# Patient Record
Sex: MALE | Race: WHITE | Employment: OTHER | URBAN - METROPOLITAN AREA
[De-identification: names, ages, dates, MRNs, and addresses within clinical notes are randomized per-mention and may not be internally consistent; named-entity substitution may affect disease eponyms.]

---

## 2018-08-14 PROBLEM — N28.9 RENAL INSUFFICIENCY: Status: ACTIVE | Noted: 2018-08-14

## 2018-08-14 PROBLEM — I10 ESSENTIAL HYPERTENSION: Status: ACTIVE | Noted: 2018-08-14

## 2018-08-14 PROBLEM — E66.09 CLASS 1 OBESITY DUE TO EXCESS CALORIES WITHOUT SERIOUS COMORBIDITY WITH BODY MASS INDEX (BMI) OF 34.0 TO 34.9 IN ADULT: Status: ACTIVE | Noted: 2018-08-14

## 2018-08-14 PROBLEM — E11.9 TYPE 2 DIABETES MELLITUS WITHOUT COMPLICATION, WITHOUT LONG-TERM CURRENT USE OF INSULIN (HCC): Status: ACTIVE | Noted: 2018-08-14

## 2018-08-14 PROBLEM — E66.811 CLASS 1 OBESITY DUE TO EXCESS CALORIES WITHOUT SERIOUS COMORBIDITY WITH BODY MASS INDEX (BMI) OF 34.0 TO 34.9 IN ADULT: Status: ACTIVE | Noted: 2018-08-14

## 2018-08-14 PROBLEM — I25.10 CORONARY ARTERY DISEASE INVOLVING NATIVE CORONARY ARTERY OF NATIVE HEART WITHOUT ANGINA PECTORIS: Status: ACTIVE | Noted: 2018-08-14

## 2019-12-30 ENCOUNTER — APPOINTMENT (OUTPATIENT)
Dept: GENERAL RADIOLOGY | Age: 69
End: 2019-12-30
Attending: EMERGENCY MEDICINE
Payer: MEDICARE

## 2019-12-30 ENCOUNTER — HOSPITAL ENCOUNTER (EMERGENCY)
Age: 69
Discharge: HOME | End: 2019-12-30
Attending: EMERGENCY MEDICINE
Payer: MEDICARE

## 2019-12-30 VITALS
DIASTOLIC BLOOD PRESSURE: 70 MMHG | WEIGHT: 210 LBS | HEART RATE: 72 BPM | OXYGEN SATURATION: 95 % | RESPIRATION RATE: 16 BRPM | BODY MASS INDEX: 33 KG/M2 | SYSTOLIC BLOOD PRESSURE: 160 MMHG | TEMPERATURE: 97 F

## 2019-12-30 DIAGNOSIS — S40.011A CONTUSION OF RIGHT SHOULDER, INITIAL ENCOUNTER: Primary | ICD-10-CM

## 2019-12-30 PROCEDURE — 99283 EMERGENCY DEPT VISIT LOW MDM: CPT | Performed by: EMERGENCY MEDICINE

## 2019-12-30 PROCEDURE — 73030 X-RAY EXAM OF SHOULDER: CPT | Performed by: EMERGENCY MEDICINE

## 2019-12-30 RX ORDER — ACETAMINOPHEN 500 MG
1000 TABLET ORAL ONCE
Status: COMPLETED | OUTPATIENT
Start: 2019-12-30 | End: 2019-12-30

## 2019-12-31 NOTE — ED PROVIDER NOTES
Patient Seen in: THE [facility] Emergency Department In [place]      History   Patient presents with:  Upper Extremity Injury    Stated Complaint: FALL LAST [address] [place] [place] HAS RT SHOULDER PAIN    HPI    Patient had a trip and fall.   He landed with the right shoulder str secondary to pain       ED Course   Labs Reviewed - No data to display       Patient treated with Tylenol for pain        MDM     X-ray shoulder   CONCLUSION: Bulmaro Sanchez is AC joint hypertrophy.   No acute fracture or dislocation right shoulder.       I recomme

## 2022-11-30 ENCOUNTER — APPOINTMENT (OUTPATIENT)
Dept: GENERAL RADIOLOGY | Age: 72
End: 2022-11-30
Attending: EMERGENCY MEDICINE
Payer: MEDICARE

## 2022-11-30 ENCOUNTER — HOSPITAL ENCOUNTER (OUTPATIENT)
Facility: HOSPITAL | Age: 72
Setting detail: OBSERVATION
Discharge: HOME | End: 2022-12-01
Attending: EMERGENCY MEDICINE | Admitting: INTERNAL MEDICINE
Payer: MEDICARE

## 2022-11-30 DIAGNOSIS — N18.9 CHRONIC KIDNEY DISEASE, UNSPECIFIED CKD STAGE: ICD-10-CM

## 2022-11-30 DIAGNOSIS — R07.9 ACUTE CHEST PAIN: Primary | ICD-10-CM

## 2022-11-30 LAB
ALBUMIN SERPL-MCNC: 4.1 G/DL (ref 3.4–5)
ALBUMIN/GLOB SERPL: 1.2 {RATIO} (ref 1–2)
ALP LIVER SERPL-CCNC: 77 U/L
ALT SERPL-CCNC: 29 U/L
ANION GAP SERPL CALC-SCNC: 9 MMOL/L (ref 0–18)
APTT PPP: 27.1 SECONDS (ref 23.3–35.6)
AST SERPL-CCNC: 14 U/L (ref 15–37)
BASOPHILS # BLD AUTO: 0.05 X10(3) UL (ref 0–0.2)
BASOPHILS NFR BLD AUTO: 0.7 %
BILIRUB SERPL-MCNC: 0.4 MG/DL (ref 0.1–2)
BUN BLD-MCNC: 56 MG/DL (ref 7–18)
CALCIUM BLD-MCNC: 9 MG/DL (ref 8.5–10.1)
CHLORIDE SERPL-SCNC: 110 MMOL/L (ref 98–112)
CO2 SERPL-SCNC: 21 MMOL/L (ref 21–32)
CREAT BLD-MCNC: 2.78 MG/DL
EOSINOPHIL # BLD AUTO: 0.48 X10(3) UL (ref 0–0.7)
EOSINOPHIL NFR BLD AUTO: 6.6 %
ERYTHROCYTE [DISTWIDTH] IN BLOOD BY AUTOMATED COUNT: 14.6 %
GFR SERPLBLD BASED ON 1.73 SQ M-ARVRAT: 23 ML/MIN/1.73M2 (ref 60–?)
GLOBULIN PLAS-MCNC: 3.3 G/DL (ref 2.8–4.4)
GLUCOSE BLD-MCNC: 131 MG/DL (ref 70–99)
HCT VFR BLD AUTO: 42.4 %
HGB BLD-MCNC: 13.9 G/DL
IMM GRANULOCYTES # BLD AUTO: 0.11 X10(3) UL (ref 0–1)
IMM GRANULOCYTES NFR BLD: 1.5 %
INR BLD: 0.91 (ref 0.85–1.16)
LYMPHOCYTES # BLD AUTO: 1.29 X10(3) UL (ref 1–4)
LYMPHOCYTES NFR BLD AUTO: 17.9 %
MAGNESIUM SERPL-MCNC: 2.2 MG/DL (ref 1.6–2.6)
MCH RBC QN AUTO: 26.4 PG (ref 26–34)
MCHC RBC AUTO-ENTMCNC: 32.8 G/DL (ref 31–37)
MCV RBC AUTO: 80.5 FL
MONOCYTES # BLD AUTO: 0.84 X10(3) UL (ref 0.1–1)
MONOCYTES NFR BLD AUTO: 11.6 %
NEUTROPHILS # BLD AUTO: 4.45 X10 (3) UL (ref 1.5–7.7)
NEUTROPHILS # BLD AUTO: 4.45 X10(3) UL (ref 1.5–7.7)
NEUTROPHILS NFR BLD AUTO: 61.7 %
NT-PROBNP SERPL-MCNC: 129 PG/ML (ref ?–125)
OSMOLALITY SERPL CALC.SUM OF ELEC: 307 MOSM/KG (ref 275–295)
PLATELET # BLD AUTO: 213 10(3)UL (ref 150–450)
POTASSIUM SERPL-SCNC: 4.6 MMOL/L (ref 3.5–5.1)
PROT SERPL-MCNC: 7.4 G/DL (ref 6.4–8.2)
PROTHROMBIN TIME: 12.3 SECONDS (ref 11.6–14.8)
RBC # BLD AUTO: 5.27 X10(6)UL
SARS-COV-2 RNA RESP QL NAA+PROBE: NOT DETECTED
SODIUM SERPL-SCNC: 140 MMOL/L (ref 136–145)
TROPONIN I HIGH SENSITIVITY: 10 NG/L
TROPONIN I HIGH SENSITIVITY: 10 NG/L
WBC # BLD AUTO: 7.2 X10(3) UL (ref 4–11)

## 2022-11-30 PROCEDURE — 99220 INITIAL OBSERVATION CARE,LEVL III: CPT | Performed by: INTERNAL MEDICINE

## 2022-11-30 PROCEDURE — 71045 X-RAY EXAM CHEST 1 VIEW: CPT | Performed by: EMERGENCY MEDICINE

## 2022-11-30 RX ORDER — ACETAMINOPHEN 500 MG
500 TABLET ORAL EVERY 4 HOURS PRN
Status: DISCONTINUED | OUTPATIENT
Start: 2022-11-30 | End: 2022-12-01

## 2022-11-30 RX ORDER — ONDANSETRON 2 MG/ML
4 INJECTION INTRAMUSCULAR; INTRAVENOUS EVERY 6 HOURS PRN
Status: DISCONTINUED | OUTPATIENT
Start: 2022-11-30 | End: 2022-12-01

## 2022-11-30 RX ORDER — CARVEDILOL 12.5 MG/1
25 TABLET ORAL 2 TIMES DAILY WITH MEALS
Status: DISCONTINUED | OUTPATIENT
Start: 2022-12-01 | End: 2022-12-01

## 2022-11-30 RX ORDER — METOCLOPRAMIDE HYDROCHLORIDE 5 MG/ML
5 INJECTION INTRAMUSCULAR; INTRAVENOUS EVERY 8 HOURS PRN
Status: DISCONTINUED | OUTPATIENT
Start: 2022-11-30 | End: 2022-12-01

## 2022-11-30 RX ORDER — BISACODYL 10 MG
10 SUPPOSITORY, RECTAL RECTAL
Status: DISCONTINUED | OUTPATIENT
Start: 2022-11-30 | End: 2022-12-01

## 2022-11-30 RX ORDER — AMLODIPINE BESYLATE 5 MG/1
10 TABLET ORAL DAILY
Status: DISCONTINUED | OUTPATIENT
Start: 2022-12-01 | End: 2022-12-01

## 2022-11-30 RX ORDER — RAMIPRIL 5 MG/1
10 CAPSULE ORAL 2 TIMES DAILY
Status: DISCONTINUED | OUTPATIENT
Start: 2022-12-01 | End: 2022-12-01

## 2022-11-30 RX ORDER — NICOTINE POLACRILEX 4 MG
30 LOZENGE BUCCAL
Status: DISCONTINUED | OUTPATIENT
Start: 2022-11-30 | End: 2022-12-01

## 2022-11-30 RX ORDER — ISOSORBIDE MONONITRATE 30 MG/1
90 TABLET, EXTENDED RELEASE ORAL DAILY
Status: DISCONTINUED | OUTPATIENT
Start: 2022-12-01 | End: 2022-12-01

## 2022-11-30 RX ORDER — SENNOSIDES 8.6 MG
17.2 TABLET ORAL NIGHTLY PRN
Status: DISCONTINUED | OUTPATIENT
Start: 2022-11-30 | End: 2022-12-01

## 2022-11-30 RX ORDER — ASPIRIN 81 MG/1
81 TABLET ORAL DAILY
Status: DISCONTINUED | OUTPATIENT
Start: 2022-12-01 | End: 2022-12-01

## 2022-11-30 RX ORDER — NICOTINE POLACRILEX 4 MG
15 LOZENGE BUCCAL
Status: DISCONTINUED | OUTPATIENT
Start: 2022-11-30 | End: 2022-12-01

## 2022-11-30 RX ORDER — POLYETHYLENE GLYCOL 3350 17 G/17G
17 POWDER, FOR SOLUTION ORAL DAILY PRN
Status: DISCONTINUED | OUTPATIENT
Start: 2022-11-30 | End: 2022-12-01

## 2022-11-30 RX ORDER — MELATONIN
3 NIGHTLY PRN
Status: DISCONTINUED | OUTPATIENT
Start: 2022-11-30 | End: 2022-12-01

## 2022-11-30 RX ORDER — DEXTROSE MONOHYDRATE 25 G/50ML
50 INJECTION, SOLUTION INTRAVENOUS
Status: DISCONTINUED | OUTPATIENT
Start: 2022-11-30 | End: 2022-12-01

## 2022-11-30 RX ORDER — NITROGLYCERIN 0.4 MG/1
0.4 TABLET SUBLINGUAL AS NEEDED
Status: DISCONTINUED | OUTPATIENT
Start: 2022-11-30 | End: 2022-12-01

## 2022-11-30 RX ORDER — ECHINACEA PURPUREA EXTRACT 125 MG
1 TABLET ORAL
Status: DISCONTINUED | OUTPATIENT
Start: 2022-11-30 | End: 2022-12-01

## 2022-11-30 RX ORDER — ROSUVASTATIN CALCIUM 5 MG/1
5 TABLET, COATED ORAL
Status: DISCONTINUED | OUTPATIENT
Start: 2022-12-02 | End: 2022-12-01

## 2022-11-30 RX ORDER — HEPARIN SODIUM 5000 [USP'U]/ML
5000 INJECTION, SOLUTION INTRAVENOUS; SUBCUTANEOUS EVERY 8 HOURS SCHEDULED
Status: DISCONTINUED | OUTPATIENT
Start: 2022-12-01 | End: 2022-12-01

## 2022-11-30 RX ORDER — ASPIRIN 81 MG/1
243 TABLET, CHEWABLE ORAL ONCE
Status: COMPLETED | OUTPATIENT
Start: 2022-11-30 | End: 2022-11-30

## 2022-11-30 NOTE — ED INITIAL ASSESSMENT (HPI)
Chest pain since 1000 this am- pain improves with Nitroglycerin at home but remains constant-- c/o drooling with pain

## 2022-12-01 ENCOUNTER — APPOINTMENT (OUTPATIENT)
Dept: CV DIAGNOSTICS | Facility: HOSPITAL | Age: 72
End: 2022-12-01
Attending: INTERNAL MEDICINE
Payer: MEDICARE

## 2022-12-01 VITALS
TEMPERATURE: 98 F | DIASTOLIC BLOOD PRESSURE: 51 MMHG | SYSTOLIC BLOOD PRESSURE: 126 MMHG | RESPIRATION RATE: 20 BRPM | HEART RATE: 66 BPM | HEIGHT: 67 IN | WEIGHT: 215 LBS | OXYGEN SATURATION: 95 % | BODY MASS INDEX: 33.74 KG/M2

## 2022-12-01 LAB
ANION GAP SERPL CALC-SCNC: 5 MMOL/L (ref 0–18)
ATRIAL RATE: 62 BPM
BUN BLD-MCNC: 50 MG/DL (ref 7–18)
CALCIUM BLD-MCNC: 9.4 MG/DL (ref 8.5–10.1)
CHLORIDE SERPL-SCNC: 114 MMOL/L (ref 98–112)
CHOLEST SERPL-MCNC: 269 MG/DL (ref ?–200)
CO2 SERPL-SCNC: 21 MMOL/L (ref 21–32)
CREAT BLD-MCNC: 2.76 MG/DL
EST. AVERAGE GLUCOSE BLD GHB EST-MCNC: 166 MG/DL (ref 68–126)
GFR SERPLBLD BASED ON 1.73 SQ M-ARVRAT: 24 ML/MIN/1.73M2 (ref 60–?)
GLUCOSE BLD-MCNC: 107 MG/DL (ref 70–99)
GLUCOSE BLD-MCNC: 124 MG/DL (ref 70–99)
GLUCOSE BLD-MCNC: 136 MG/DL (ref 70–99)
GLUCOSE BLD-MCNC: 158 MG/DL (ref 70–99)
HBA1C MFR BLD: 7.4 % (ref ?–5.7)
HDLC SERPL-MCNC: 35 MG/DL (ref 40–59)
LDLC SERPL DIRECT ASSAY-MCNC: 78 MG/DL (ref ?–100)
MAGNESIUM SERPL-MCNC: 2.4 MG/DL (ref 1.6–2.6)
NONHDLC SERPL-MCNC: 234 MG/DL (ref ?–130)
OSMOLALITY SERPL CALC.SUM OF ELEC: 307 MOSM/KG (ref 275–295)
P AXIS: 40 DEGREES
P-R INTERVAL: 274 MS
POTASSIUM SERPL-SCNC: 4.3 MMOL/L (ref 3.5–5.1)
Q-T INTERVAL: 466 MS
QRS DURATION: 178 MS
QTC CALCULATION (BEZET): 472 MS
R AXIS: -65 DEGREES
SODIUM SERPL-SCNC: 140 MMOL/L (ref 136–145)
T AXIS: 91 DEGREES
TRIGL SERPL-MCNC: 920 MG/DL (ref 30–149)
TROPONIN I HIGH SENSITIVITY: 10 NG/L
TROPONIN I HIGH SENSITIVITY: 8 NG/L
VENTRICULAR RATE: 62 BPM

## 2022-12-01 PROCEDURE — 99217 OBSERVATION CARE DISCHARGE: CPT | Performed by: HOSPITALIST

## 2022-12-01 RX ORDER — TAMSULOSIN HYDROCHLORIDE 0.4 MG/1
0.4 CAPSULE ORAL
Status: DISCONTINUED | OUTPATIENT
Start: 2022-12-01 | End: 2022-12-01

## 2022-12-01 RX ORDER — CARVEDILOL 12.5 MG/1
25 TABLET ORAL 2 TIMES DAILY WITH MEALS
Status: DISCONTINUED | OUTPATIENT
Start: 2022-12-01 | End: 2022-12-01

## 2022-12-01 RX ORDER — FENOFIBRATE 134 MG/1
134 CAPSULE ORAL
Status: DISCONTINUED | OUTPATIENT
Start: 2022-12-02 | End: 2022-12-01

## 2022-12-01 RX ORDER — CARVEDILOL 12.5 MG/1
50 TABLET ORAL 2 TIMES DAILY WITH MEALS
Status: DISCONTINUED | OUTPATIENT
Start: 2022-12-01 | End: 2022-12-01

## 2022-12-01 RX ORDER — FENOFIBRATE 134 MG/1
134 CAPSULE ORAL
Qty: 30 CAPSULE | Refills: 0 | Status: SHIPPED | OUTPATIENT
Start: 2022-12-02

## 2022-12-01 RX ORDER — TAMSULOSIN HYDROCHLORIDE 0.4 MG/1
0.4 CAPSULE ORAL
Status: ON HOLD | COMMUNITY
End: 2022-12-01

## 2022-12-01 NOTE — PLAN OF CARE
Assumed care of patient at 0730   A&Ox4  Room air  Tele NSR  Accuchecks  Denies pain or discomfort    All safety precautions in place  All needs met at this time

## 2022-12-01 NOTE — PROGRESS NOTES
68 y/o male with hx stent x 4 at [address] [facility], strong fam hx, dyslipidemia, Trig 900(was immeasurable), HTN, CKD CVA, pacer with several months f increase SOB and fatigue. Vague atypical left upper CP x 2 days. Troponin normal. Currently pain free. Anxious to go home. Stess test this summer in [place] was normal. Prior EF 49%. Cath [address] [place] [address] 8/21 moderate non obstructive CAD with normal right heart. Would d Echo as outpatient and see me 2 weeks. If CP worsens he will call me. I am bothered by fatigue and SOB. May need sleep study eventually.      Thanks  Awais 5

## 2022-12-01 NOTE — ED QUICK NOTES
IV secured with coban. Wheelchair offered to waiting vehicle, but PT declined. Nitropaste remains in place, per ED doctor. Pt and spouse agreed to \"admit by car\" instructions.

## 2022-12-01 NOTE — PROGRESS NOTES
NURSING ADMISSION NOTE    Oriented to room. Safety precautions initiated. Bed in low position. Call light in reach. Pt awake and alert. On tele running NSR. Was placed on 3L NC, now on RA.   Accuchecks

## 2022-12-01 NOTE — ED QUICK NOTES
Orders for admission, patient is aware of plan and ready to go upstairs. Any questions, please call ED RN [address] [facility]  at extension [phone]. Vaccinated?  YES  Type of COVID test sent:RAPID  COVID Suspicion level: Low      Titratable drug(s) infusing:NA  Rate:NA    LOC at time of transport:  ALERT AND ORIENTED  Other pertinent information:  PT IS REFUSING AMBULANCE AND WIFE WILL BE DRIVING HIM  CIWA score=NA  NIH score=NA

## 2022-12-01 NOTE — PROGRESS NOTES
NURSING DISCHARGE NOTE    Discharged Home via Ambulatory. Accompanied by Family member  Belongings Taken by patient/family. AVS reviewed.  Understanding expressed  Patient and spouse aware of appointments to be made at discharge    Medically stable to discharge

## 2022-12-15 ENCOUNTER — ORDER TRANSCRIPTION (OUTPATIENT)
Dept: ADMINISTRATIVE | Facility: HOSPITAL | Age: 72
End: 2022-12-15

## 2022-12-15 DIAGNOSIS — Z01.812 PRE-PROCEDURE LAB EXAM: Primary | ICD-10-CM

## 2022-12-17 ENCOUNTER — LAB ENCOUNTER (OUTPATIENT)
Dept: LAB | Age: 72
End: 2022-12-17
Attending: FAMILY MEDICINE
Payer: MEDICARE

## 2022-12-17 DIAGNOSIS — Z01.812 PRE-OPERATIVE LABORATORY EXAMINATION: ICD-10-CM

## 2022-12-17 DIAGNOSIS — N18.4 CHRONIC KIDNEY DISEASE, STAGE IV (SEVERE) (HCC): ICD-10-CM

## 2022-12-17 DIAGNOSIS — E11.9 DIABETES MELLITUS (HCC): ICD-10-CM

## 2022-12-17 DIAGNOSIS — I25.10 CORONARY ATHEROSCLEROSIS OF NATIVE CORONARY ARTERY: Primary | ICD-10-CM

## 2022-12-17 DIAGNOSIS — Z01.812 PRE-PROCEDURE LAB EXAM: ICD-10-CM

## 2022-12-17 DIAGNOSIS — R07.9 CHEST PAIN, UNSPECIFIED: ICD-10-CM

## 2022-12-17 LAB
ANION GAP SERPL CALC-SCNC: 6 MMOL/L (ref 0–18)
BUN BLD-MCNC: 54 MG/DL (ref 7–18)
CALCIUM BLD-MCNC: 9.8 MG/DL (ref 8.5–10.1)
CHLORIDE SERPL-SCNC: 113 MMOL/L (ref 98–112)
CO2 SERPL-SCNC: 21 MMOL/L (ref 21–32)
CREAT BLD-MCNC: 2.64 MG/DL
FASTING STATUS PATIENT QL REPORTED: YES
GFR SERPLBLD BASED ON 1.73 SQ M-ARVRAT: 25 ML/MIN/1.73M2 (ref 60–?)
GLUCOSE BLD-MCNC: 148 MG/DL (ref 70–99)
OSMOLALITY SERPL CALC.SUM OF ELEC: 308 MOSM/KG (ref 275–295)
POTASSIUM SERPL-SCNC: 4.6 MMOL/L (ref 3.5–5.1)
SODIUM SERPL-SCNC: 140 MMOL/L (ref 136–145)

## 2022-12-17 PROCEDURE — 36415 COLL VENOUS BLD VENIPUNCTURE: CPT

## 2022-12-17 PROCEDURE — 80048 BASIC METABOLIC PNL TOTAL CA: CPT

## 2022-12-18 LAB — SARS-COV-2 RNA RESP QL NAA+PROBE: NOT DETECTED

## 2022-12-20 ENCOUNTER — HOSPITAL ENCOUNTER (OUTPATIENT)
Dept: INTERVENTIONAL RADIOLOGY/VASCULAR | Facility: HOSPITAL | Age: 72
Discharge: HOME | End: 2022-12-20
Attending: INTERNAL MEDICINE | Admitting: INTERNAL MEDICINE
Payer: MEDICARE

## 2022-12-20 VITALS
RESPIRATION RATE: 16 BRPM | HEIGHT: 67 IN | OXYGEN SATURATION: 99 % | WEIGHT: 210 LBS | SYSTOLIC BLOOD PRESSURE: 146 MMHG | TEMPERATURE: 98 F | DIASTOLIC BLOOD PRESSURE: 60 MMHG | HEART RATE: 63 BPM | BODY MASS INDEX: 32.96 KG/M2

## 2022-12-20 DIAGNOSIS — I25.10 CAD (CORONARY ARTERY DISEASE): ICD-10-CM

## 2022-12-20 LAB
ATRIAL RATE: 60 BPM
ATRIAL RATE: 60 BPM
GLUCOSE BLD-MCNC: 129 MG/DL (ref 70–99)
P AXIS: 69 DEGREES
P AXIS: 91 DEGREES
P-R INTERVAL: 292 MS
P-R INTERVAL: 316 MS
Q-T INTERVAL: 488 MS
Q-T INTERVAL: 496 MS
QRS DURATION: 176 MS
QRS DURATION: 178 MS
QTC CALCULATION (BEZET): 488 MS
QTC CALCULATION (BEZET): 496 MS
R AXIS: -58 DEGREES
R AXIS: -63 DEGREES
T AXIS: 102 DEGREES
T AXIS: 98 DEGREES
VENTRICULAR RATE: 60 BPM
VENTRICULAR RATE: 60 BPM

## 2022-12-20 PROCEDURE — B211YZZ FLUOROSCOPY OF MULTIPLE CORONARY ARTERIES USING OTHER CONTRAST: ICD-10-PCS | Performed by: INTERNAL MEDICINE

## 2022-12-20 PROCEDURE — B240ZZ3 ULTRASONOGRAPHY OF SINGLE CORONARY ARTERY, INTRAVASCULAR: ICD-10-PCS | Performed by: INTERNAL MEDICINE

## 2022-12-20 PROCEDURE — 93458 L HRT ARTERY/VENTRICLE ANGIO: CPT | Performed by: INTERNAL MEDICINE

## 2022-12-20 PROCEDURE — 92978 ENDOLUMINL IVUS OCT C 1ST: CPT | Performed by: INTERNAL MEDICINE

## 2022-12-20 PROCEDURE — 93005 ELECTROCARDIOGRAM TRACING: CPT

## 2022-12-20 PROCEDURE — 82962 GLUCOSE BLOOD TEST: CPT

## 2022-12-20 PROCEDURE — 93571 IV DOP VEL&/PRESS C FLO 1ST: CPT | Performed by: INTERNAL MEDICINE

## 2022-12-20 PROCEDURE — 4A023N7 MEASUREMENT OF CARDIAC SAMPLING AND PRESSURE, LEFT HEART, PERCUTANEOUS APPROACH: ICD-10-PCS | Performed by: INTERNAL MEDICINE

## 2022-12-20 PROCEDURE — 93010 ELECTROCARDIOGRAM REPORT: CPT | Performed by: INTERNAL MEDICINE

## 2022-12-20 PROCEDURE — 4A033BC MEASUREMENT OF ARTERIAL PRESSURE, CORONARY, PERCUTANEOUS APPROACH: ICD-10-PCS | Performed by: INTERNAL MEDICINE

## 2022-12-20 PROCEDURE — 027034Z DILATION OF CORONARY ARTERY, ONE ARTERY WITH DRUG-ELUTING INTRALUMINAL DEVICE, PERCUTANEOUS APPROACH: ICD-10-PCS | Performed by: INTERNAL MEDICINE

## 2022-12-20 PROCEDURE — 99152 MOD SED SAME PHYS/QHP 5/>YRS: CPT | Performed by: INTERNAL MEDICINE

## 2022-12-20 PROCEDURE — 99153 MOD SED SAME PHYS/QHP EA: CPT | Performed by: INTERNAL MEDICINE

## 2022-12-20 PROCEDURE — 99211 OFF/OP EST MAY X REQ PHY/QHP: CPT

## 2022-12-20 RX ORDER — IODIXANOL 320 MG/ML
100 INJECTION, SOLUTION INTRAVASCULAR
Status: COMPLETED | OUTPATIENT
Start: 2022-12-20 | End: 2022-12-20

## 2022-12-20 RX ORDER — ADENOSINE 3 MG/ML
INJECTION, SOLUTION INTRAVENOUS
Status: COMPLETED
Start: 2022-12-20 | End: 2022-12-20

## 2022-12-20 RX ORDER — MIDAZOLAM HYDROCHLORIDE 1 MG/ML
INJECTION INTRAMUSCULAR; INTRAVENOUS
Status: COMPLETED
Start: 2022-12-20 | End: 2022-12-20

## 2022-12-20 RX ORDER — LIDOCAINE HYDROCHLORIDE 10 MG/ML
INJECTION, SOLUTION EPIDURAL; INFILTRATION; INTRACAUDAL; PERINEURAL
Status: COMPLETED
Start: 2022-12-20 | End: 2022-12-20

## 2022-12-20 RX ORDER — HEPARIN SODIUM 5000 [USP'U]/ML
INJECTION, SOLUTION INTRAVENOUS; SUBCUTANEOUS
Status: COMPLETED
Start: 2022-12-20 | End: 2022-12-20

## 2022-12-20 RX ORDER — ASPIRIN 81 MG/1
81 TABLET ORAL DAILY
Status: DISCONTINUED | OUTPATIENT
Start: 2022-12-21 | End: 2022-12-20

## 2022-12-20 RX ORDER — SODIUM CHLORIDE 9 MG/ML
INJECTION, SOLUTION INTRAVENOUS CONTINUOUS
Status: ACTIVE | OUTPATIENT
Start: 2022-12-20 | End: 2022-12-20

## 2022-12-20 RX ORDER — ONDANSETRON 2 MG/ML
INJECTION INTRAMUSCULAR; INTRAVENOUS
Status: COMPLETED
Start: 2022-12-20 | End: 2022-12-20

## 2022-12-20 RX ORDER — SODIUM CHLORIDE 9 MG/ML
INJECTION, SOLUTION INTRAVENOUS CONTINUOUS
Status: DISCONTINUED | OUTPATIENT
Start: 2022-12-20 | End: 2022-12-20

## 2022-12-20 RX ADMIN — IODIXANOL 45 ML: 320 INJECTION, SOLUTION INTRAVASCULAR at 13:39:00

## 2022-12-20 RX ADMIN — SODIUM CHLORIDE: 9 INJECTION, SOLUTION INTRAVENOUS at 09:00:00

## 2022-12-20 NOTE — IVS NOTE
Patient discharged home post PCI. EKG completed. PT c/o chest burning. Milk of magnesium given. Dr. Tomasa Zarco notified. After 2 hours pain resolved. VSS stable Dr. Tomasa Zarco spoke to patient and ok with discharging patient home. Cardiac rehab spoke to patient and wife. AVS reviewed. All questions answered. PT able to ambulate in hallway without difficultly. Dressing to right groin clean, dry and intact. PIV removed. PT discharged in wheelchair with all belongings. Wife driving home.

## 2022-12-20 NOTE — CARDIAC REHAB
CAD/post PCI teaching completed with pt and spouse. Stent card given. Pt referred to out pt ph 2 cardiac rehab. Lives in [place], [facility] which is close to home.

## 2022-12-21 NOTE — PROCEDURES
[facility]    PATIENT'S NAME: Lance Goldstein   ATTENDING PHYSICIAN: Lakesha Rizzo M.D. OPERATING PHYSICIAN: Lakesha Rizzo M.D. PATIENT ACCOUNT#:   [de-identified]    LOCATION:  [address]  MEDICAL RECORD #:   [id number]       YOB: 1950  ADMISSION DATE:       12/20/2022      OPERATION DATE:  12/20/2022    CARDIAC PROCEDURE TRANSCRIPTION    CARDIAC CATHETERIZATION/PERCUTANEOUS CORONARY INTERVENTION     PREOPERATIVE DIAGNOSIS:  POSTOPERATIVE DIAGNOSIS:  PROCEDURE PERFORMED:    INDICATIONS:  A 34-year-old male with multiple risk factors and prior stenting of the right coronary artery, with recurrence of his shortness of breath and fatigue. He presented to the emergency room with chest pain, had gone home, and now for cardiac catheterization. DESCRIPTION OF PROCEDURE:  The right groin was anesthetized. A 6-Faroese sheath was introduced. Given his renal dysfunction, we decided not to do an LV angiogram.  LVEDP was 14. Left coronary arteriography:  There is moderate disease of the proximal LAD, approximately 30% to 40%. Mild disease of the circumflex. The right coronary artery has a stent with some in-stent restenosis proximally, and distally about an 80% to 90% stenosis. FFR was done and was 0.80. IVUS was performed, and there is near plugging of the vessel with almost complete obliteration distally. The prior stents were about 3.0 and about a 4.5 to 5.0 vessel. The vessel was predilated, then a 3.5 x 38 stent was deployed and postdilated with a 4.0 and 4.5 stent. The original stent was postdilated with a 4.5. There is an area of overlapping stents proximally that there was some watermeloning. I did not think this was worth going in with a 5.0. IVUS showed excellent apposition of the stent, widely patent, with still a zone for bypass if needed in the future. Total dye contrast we used was estimated at 26 mL. Overall excellent result. IMPRESSION:  1. Moderate disease of the left anterior descending. 2.   Mild disease of the circumflex. 3.   An 80% to 90% distally with an FFR of 0.8 and IVUS showing complete obliteration of the distal vessel with a significantly underdeployed stent in most of the vessel with a 3.0 stent in about a 4.5 to 5.0 vessel. 4.   Successful stenting of the distal with a 3.5 x 38 postdilated with a 4.0 and the entire vessel postdilated with a 4.5 NC with IVUS guided and overall excellent result.     Dictated By Joaquín Kearney M.D.  d: 12/20/2022 13:56:56  t: 12/20/2022 19:00:14  [facility] [id number]  IRI/

## 2023-10-23 ENCOUNTER — OFFICE VISIT (OUTPATIENT)
Dept: NEPHROLOGY | Facility: CLINIC | Age: 73
End: 2023-10-23
Payer: MEDICARE

## 2023-10-23 VITALS — WEIGHT: 217 LBS | DIASTOLIC BLOOD PRESSURE: 78 MMHG | SYSTOLIC BLOOD PRESSURE: 156 MMHG | BODY MASS INDEX: 34 KG/M2

## 2023-10-23 DIAGNOSIS — D63.1 ANEMIA DUE TO STAGE 4 CHRONIC KIDNEY DISEASE: ICD-10-CM

## 2023-10-23 DIAGNOSIS — N18.4 ANEMIA DUE TO STAGE 4 CHRONIC KIDNEY DISEASE: ICD-10-CM

## 2023-10-23 DIAGNOSIS — N18.4 CKD (CHRONIC KIDNEY DISEASE) STAGE 4, GFR 15-29 ML/MIN (HCC): Primary | ICD-10-CM

## 2023-10-23 RX ORDER — SODIUM BICARBONATE 650 MG/1
650 TABLET ORAL DAILY
COMMUNITY

## 2023-10-23 RX ORDER — TAMSULOSIN HYDROCHLORIDE 0.4 MG/1
0.4 CAPSULE ORAL DAILY
COMMUNITY

## 2023-10-26 ENCOUNTER — LAB ENCOUNTER (OUTPATIENT)
Dept: LAB | Facility: HOSPITAL | Age: 73
End: 2023-10-26
Attending: INTERNAL MEDICINE

## 2023-10-26 DIAGNOSIS — I25.10 CORONARY ATHEROSCLEROSIS OF NATIVE CORONARY ARTERY: Primary | ICD-10-CM

## 2023-10-26 DIAGNOSIS — E78.2 MIXED HYPERLIPIDEMIA: ICD-10-CM

## 2023-10-26 DIAGNOSIS — E11.9 DIABETES MELLITUS (HCC): ICD-10-CM

## 2023-10-26 DIAGNOSIS — I10 ESSENTIAL HYPERTENSION, MALIGNANT: ICD-10-CM

## 2023-10-26 DIAGNOSIS — E78.1 PURE HYPERGLYCERIDEMIA: ICD-10-CM

## 2023-10-26 LAB
ABSOLUTE BASOPHILS: 32 CELLS/UL (ref 0–200)
ABSOLUTE EOSINOPHILS: 202 CELLS/UL (ref 15–500)
ABSOLUTE LYMPHOCYTES: 1096 CELLS/UL (ref 850–3900)
ABSOLUTE MONOCYTES: 649 CELLS/UL (ref 200–950)
ABSOLUTE NEUTROPHILS: 4322 CELLS/UL (ref 1500–7800)
BASOPHILS # BLD AUTO: 0.03 X10(3) UL (ref 0–0.2)
BASOPHILS NFR BLD AUTO: 0.5 %
BASOPHILS: 0.5 %
BUN/CREATININE RATIO: 18 (CALC) (ref 6–22)
BUN: 56 MG/DL (ref 7–25)
CALCIUM: 9.5 MG/DL (ref 8.6–10.3)
CARBON DIOXIDE: 24 MMOL/L (ref 20–32)
CHLORIDE: 106 MMOL/L (ref 98–110)
CREATININE: 3.07 MG/DL (ref 0.7–1.28)
CRP SERPL HS-MCNC: 1.99 MG/L (ref ?–3)
EGFR: 21 ML/MIN/1.73M2
EOSINOPHIL # BLD AUTO: 0.21 X10(3) UL (ref 0–0.7)
EOSINOPHIL NFR BLD AUTO: 3.3 %
EOSINOPHILS: 3.2 %
ERYTHROCYTE [DISTWIDTH] IN BLOOD BY AUTOMATED COUNT: 13.8 %
ERYTHROCYTE [SEDIMENTATION RATE] IN BLOOD: 30 MM/HR
GLUCOSE: 111 MG/DL (ref 65–99)
HCT VFR BLD AUTO: 43.8 %
HEMATOCRIT: 42.3 % (ref 38.5–50)
HEMOGLOBIN: 13.6 G/DL (ref 13.2–17.1)
HGB BLD-MCNC: 13.9 G/DL
IMM GRANULOCYTES # BLD AUTO: 0.04 X10(3) UL (ref 0–1)
IMM GRANULOCYTES NFR BLD: 0.6 %
LYMPHOCYTES # BLD AUTO: 1.17 X10(3) UL (ref 1–4)
LYMPHOCYTES NFR BLD AUTO: 18.4 %
LYMPHOCYTES: 17.4 %
MCH RBC QN AUTO: 25.8 PG (ref 26–34)
MCH: 26.1 PG (ref 27–33)
MCHC RBC AUTO-ENTMCNC: 31.7 G/DL (ref 31–37)
MCHC: 32.2 G/DL (ref 32–36)
MCV RBC AUTO: 81.4 FL
MCV: 81.2 FL (ref 80–100)
MONOCYTES # BLD AUTO: 0.67 X10(3) UL (ref 0.1–1)
MONOCYTES NFR BLD AUTO: 10.5 %
MONOCYTES: 10.3 %
MPV: 11.1 FL (ref 7.5–12.5)
NEUTROPHILS # BLD AUTO: 4.24 X10 (3) UL (ref 1.5–7.7)
NEUTROPHILS # BLD AUTO: 4.24 X10(3) UL (ref 1.5–7.7)
NEUTROPHILS NFR BLD AUTO: 66.7 %
NEUTROPHILS: 68.6 %
PHOSPHATE (AS PHOSPHORUS): 4.7 MG/DL (ref 2.1–4.3)
PLATELET # BLD AUTO: 208 10(3)UL (ref 150–450)
PLATELET COUNT: 219 THOUSAND/UL (ref 140–400)
POTASSIUM: 4.7 MMOL/L (ref 3.5–5.3)
RBC # BLD AUTO: 5.38 X10(6)UL
RDW: 14 % (ref 11–15)
RED BLOOD CELL COUNT: 5.21 MILLION/UL (ref 4.2–5.8)
SODIUM: 139 MMOL/L (ref 135–146)
WBC # BLD AUTO: 6.4 X10(3) UL (ref 4–11)
WHITE BLOOD CELL COUNT: 6.3 THOUSAND/UL (ref 3.8–10.8)

## 2023-10-26 PROCEDURE — 86141 C-REACTIVE PROTEIN HS: CPT

## 2023-10-26 PROCEDURE — 85652 RBC SED RATE AUTOMATED: CPT

## 2023-10-26 PROCEDURE — 36415 COLL VENOUS BLD VENIPUNCTURE: CPT

## 2023-10-26 PROCEDURE — 85025 COMPLETE CBC W/AUTO DIFF WBC: CPT

## 2024-04-17 ENCOUNTER — HOSPITAL ENCOUNTER (OUTPATIENT)
Dept: LAB | Facility: HOSPITAL | Age: 74
Discharge: HOME | End: 2024-04-17
Attending: INTERNAL MEDICINE
Payer: MEDICARE

## 2024-04-17 DIAGNOSIS — N18.4 CKD (CHRONIC KIDNEY DISEASE) STAGE 4, GFR 15-29 ML/MIN (HCC): ICD-10-CM

## 2024-04-17 DIAGNOSIS — N18.4 ANEMIA DUE TO STAGE 4 CHRONIC KIDNEY DISEASE (HCC): ICD-10-CM

## 2024-04-17 DIAGNOSIS — D63.1 ANEMIA DUE TO STAGE 4 CHRONIC KIDNEY DISEASE (HCC): ICD-10-CM

## 2024-04-17 LAB
ANION GAP SERPL CALC-SCNC: 7 MMOL/L (ref 0–18)
BASOPHILS # BLD AUTO: 0.04 X10(3) UL (ref 0–0.2)
BASOPHILS NFR BLD AUTO: 0.5 %
BUN BLD-MCNC: 56 MG/DL (ref 9–23)
CALCIUM BLD-MCNC: 10 MG/DL (ref 8.5–10.1)
CHLORIDE SERPL-SCNC: 109 MMOL/L (ref 98–112)
CO2 SERPL-SCNC: 23 MMOL/L (ref 21–32)
CREAT BLD-MCNC: 3.15 MG/DL
EGFRCR SERPLBLD CKD-EPI 2021: 20 ML/MIN/1.73M2 (ref 60–?)
EOSINOPHIL # BLD AUTO: 0.27 X10(3) UL (ref 0–0.7)
EOSINOPHIL NFR BLD AUTO: 3.5 %
ERYTHROCYTE [DISTWIDTH] IN BLOOD BY AUTOMATED COUNT: 14.7 %
FASTING STATUS PATIENT QL REPORTED: NO
GLUCOSE BLD-MCNC: 221 MG/DL (ref 70–99)
HCT VFR BLD AUTO: 42.2 %
HGB BLD-MCNC: 13.9 G/DL
IMM GRANULOCYTES # BLD AUTO: 0.03 X10(3) UL (ref 0–1)
IMM GRANULOCYTES NFR BLD: 0.4 %
LYMPHOCYTES # BLD AUTO: 1.14 X10(3) UL (ref 1–4)
LYMPHOCYTES NFR BLD AUTO: 15 %
MCH RBC QN AUTO: 25.9 PG (ref 26–34)
MCHC RBC AUTO-ENTMCNC: 32.9 G/DL (ref 31–37)
MCV RBC AUTO: 78.7 FL
MONOCYTES # BLD AUTO: 0.72 X10(3) UL (ref 0.1–1)
MONOCYTES NFR BLD AUTO: 9.4 %
NEUTROPHILS # BLD AUTO: 5.42 X10 (3) UL (ref 1.5–7.7)
NEUTROPHILS # BLD AUTO: 5.42 X10(3) UL (ref 1.5–7.7)
NEUTROPHILS NFR BLD AUTO: 71.2 %
OSMOLALITY SERPL CALC.SUM OF ELEC: 310 MOSM/KG (ref 275–295)
PHOSPHATE SERPL-MCNC: 3.7 MG/DL (ref 2.5–4.9)
PLATELET # BLD AUTO: 209 10(3)UL (ref 150–450)
POTASSIUM SERPL-SCNC: 4.2 MMOL/L (ref 3.5–5.1)
RBC # BLD AUTO: 5.36 X10(6)UL
SODIUM SERPL-SCNC: 139 MMOL/L (ref 136–145)
WBC # BLD AUTO: 7.6 X10(3) UL (ref 4–11)

## 2024-04-17 PROCEDURE — 80048 BASIC METABOLIC PNL TOTAL CA: CPT

## 2024-04-17 PROCEDURE — 36415 COLL VENOUS BLD VENIPUNCTURE: CPT

## 2024-04-17 PROCEDURE — 85025 COMPLETE CBC W/AUTO DIFF WBC: CPT

## 2024-04-17 PROCEDURE — 84100 ASSAY OF PHOSPHORUS: CPT

## 2024-04-29 ENCOUNTER — OFFICE VISIT (OUTPATIENT)
Dept: NEPHROLOGY | Facility: CLINIC | Age: 74
End: 2024-04-29
Payer: MEDICARE

## 2024-04-29 VITALS — WEIGHT: 214.5 LBS | BODY MASS INDEX: 34 KG/M2 | DIASTOLIC BLOOD PRESSURE: 58 MMHG | SYSTOLIC BLOOD PRESSURE: 130 MMHG

## 2024-04-29 DIAGNOSIS — N18.4 CKD (CHRONIC KIDNEY DISEASE) STAGE 4, GFR 15-29 ML/MIN (HCC): Primary | ICD-10-CM

## 2024-04-29 DIAGNOSIS — I10 PRIMARY HYPERTENSION: ICD-10-CM

## 2024-04-29 NOTE — PROGRESS NOTES
Nephrology Progress Note      ASSESSMENT/PLAN:      1) CKD 4- SCr 3.1 mg/dl (eGFR 20 ml/min- Cr 2.0 mg/dl 2019) presumably due to longstanding diabetes and hypertension and is currently being evaluated for kidney transplant.  He has been seen by Dr. Mason over the last 8 to 10 years and apparently underwent a kidney biopsy prior to this.  May have received glucocorticoids initially but this was [facility].  Electrolytes, acid-base and volume status acceptable; he is not anemic.  There is no indication for renal replacement therapy at this time.  BP and diabetes are well controlled. PLAN- d/w pt and wife at length. Remains relatively asymptomatic; electrolytes acid-base and volume status are acceptable.  Is unlikely to reach ESRD within the next 12 to 18 months. Recheck labs and f/u in 6 months.     2) Anemia- hgb 13 g/dl- no need for EPO until hgb < 10 g/dl while Fe replete    3) DM 2 x 15-20 yrs- well controlled on glipizide + farxiga    4) CAD s/p yearly stents x 5 last 12/22 on brilinta / statin / BB / ASA    5) BPH- upcoming procedure with     Was turned down for transplant at [facility] due to cardiac risk despite preserved EF + negative lexiscan- to confirm with transplant coordinator. May have living donors.         HPI:   Kin Guerra is a 73 year old male with   Chief Complaint   Patient presents with    Chronic Kidney Disease     VIN MASON    Very pleasant 73-year-old male presents for evaluation of chronic kidney disease; had been seeing Dr. Mason x yrs and recent evaluated for kidney transplantation.  History told him for potential donors but he prefer not to ask them.  Underwent a kidney biopsy 10 years ago thought to have possible glomerular disease and was initially started on steroids but this was discontinued by nephrologist at [facility] as above.  No history of acute renal failure, gross microscopic hematuria proteinuria kidney stones or infections.  No history of  congestive heart failure.  He does have at the pacemaker.  No history of lung or liver disease.  Does not smoke or drink.  Is a retired lindsay.    ROS:    Denies fever/chills  Denies wt loss/gain  Denies HA or visual changes  Denies CP or palpitations  Denies SOB/cough/hemoptysis  Denies abd or flank pain  Denies N/V/D  Denies change in urinary habits or gross hematuria  Denies LE edema  Denies skin rashes/myalgias/arthralgias    PMH:  Past Medical History:    Atherosclerosis of coronary artery    Cancer (HCC)    bladder    Chronic renal insufficiency    Diabetes (HCC)    Heart attack (HCC)    High blood pressure    High cholesterol    Obesity    Stroke (HCC)    X2       PSH:  Past Surgical History:   Procedure Laterality Date    Angioplasty (coronary)  2016    stent right coronary artery X3    Other surgical history  2002 2003    2x knee replacements       Medications (Active prior to today's visit):  Current Outpatient Medications   Medication Sig Dispense Refill    ticagrelor 60 MG Oral Tab Take 1 tablet (60 mg total) by mouth 2 (two) times daily.      tamsulosin 0.4 MG Oral Cap Take 1 capsule (0.4 mg total) by mouth daily.      sodium bicarbonate 650 MG Oral Tab Take 1 tablet (650 mg total) by mouth daily.      dapagliflozin 5 MG Oral Tab Take 1 tablet (5 mg total) by mouth daily.      carvedilol 25 MG Oral Tab Take 1 tablet (25 mg total) by mouth 2 (two) times daily with meals.      aspirin 81 MG Oral Tab EC Take 1 tablet (81 mg total) by mouth daily.      rosuvastatin 5 MG Oral Tab Take 1 tablet (5 mg total) by mouth. Take 5 days per week.      nitroGLYCERIN 0.4 MG Sublingual SL Tab Place 0.4 mg under the tongue as needed.      ramipril 10 MG Oral Cap Take 1 capsule (10 mg total) by mouth 2 (two) times daily.      amLODIPine 5 MG Oral Tab Take 1 tablet (5 mg total) by mouth in the morning and 1 tablet (5 mg total) before bedtime.      glipiZIDE ER 5 MG Oral Tablet 24 Hr Take 1 tablet (5 mg total) by mouth  daily with breakfast.         Allergies:  Allergies   Allergen Reactions    Clopidogrel UNKNOWN and SWELLING     Other reaction(s): Swelling (localized)  Other reaction(s): Other (See Comments)  Cerner Allergy Text Annotation: Plavix, Cerner Reaction: swelling    Radiology Contrast Iodinated Dyes RENAL INSUFFICIENCY    Sulfamethoxazole SWELLING and OTHER (SEE COMMENTS)     Cerner Allergy Text Annotation: sulfamethoxazole, Cerner Reaction: swelling    Sulfa Antibiotics UNKNOWN       Social History:  Social History     Socioeconomic History    Marital status:    Tobacco Use    Smoking status: Never    Smokeless tobacco: Never   Substance and Sexual Activity    Alcohol use: No    Drug use: No        Family History:  Denies family history of kidney disease.    PHYSICAL EXAM:   /58 (BP Location: Left arm, Patient Position: Sitting)   Wt 214 lb 8 oz (97.3 kg)   BMI 33.60 kg/m²    Wt Readings from Last 3 Encounters:   04/29/24 214 lb 8 oz (97.3 kg)   10/23/23 217 lb (98.4 kg)   12/16/22 210 lb (95.3 kg)     General: Alert and oriented in no apparent distress.  HEENT: No scleral icterus, MMM  Neck: Supple, no JOSE D or thyromegaly  Cardiac: Regular rate and rhythm, S1, S2 normal, no murmur or rub  Lungs: Clear without wheezes, rales, rhonchi.    Abdomen: Soft, non-tender. + bowel sounds, no palpable organomegaly  Extremities: Without clubbing, cyanosis or edema.  Neurologic:  normal affect, cranial nerves grossly intact, moving all extremities  Skin: Warm and dry, no rashes        Kailyn Joy MD  4/29/2024  137 PM

## 2024-04-29 NOTE — PROGRESS NOTES
Nephrology Consult Note    REASON FOR CONSULT: CKD 4    ASSESSMENT/PLAN:      1) CKD 4- SCr 3.1 mg/dl (eGFR 20 ml/min- Cr 2.0 mg/dl 2019) presumably due to longstanding diabetes and hypertension and is currently being evaluated for kidney transplant.  He has been seen by Dr. Mason over the last 8 to 10 years and apparently underwent a kidney biopsy prior to this.  May have received glucocorticoids initially but this was discontinued by [facility]. Sheets at [facility].  Electrolytes, acid-base and volume status acceptable; he is not anemic.  There is no indication for renal replacement therapy at this time.  BP and diabetes are well controlled. PLAN- d/w pt and wife at length. Discussed natural history of diabetic and hypertensive kidney disease; appears to be a reasonable candidate for kidney transplantation.  Will not pursue further w/u; focus on BP / DM mgmt. To check labs q3 months and f/u in 6 months.     2) Anemia- hgb 13 g/dl- no need for EPO until hgb < 10 g/dl while Fe replete    3) DM 2 x 15-20 yrs- well controlled on glipizide + farxiga    4) CAD s/p yearly stents x 5 last 12/22 on brilinta / statin / BB / ASA    5) BPH- needs greenlight per  but has been held due to brilinta        HPI:   Kin Guerra is a 73 year old male with   Chief Complaint   Patient presents with    Chronic Kidney Disease     VIN MASON    Very pleasant 73-year-old male presents for evaluation of chronic kidney disease; had been seeing Dr. Mason x yrs and recent evaluated for kidney transplantation.  History told him for potential donors but he prefer not to ask them.  Underwent a kidney biopsy 10 years ago thought to have possible glomerular disease and was initially started on steroids but this was discontinued by nephrologist at [facility] as above.  No history of acute renal failure, gross microscopic hematuria proteinuria kidney stones or infections.  No history of congestive heart failure.  He does have at the pacemaker.  No  history of lung or liver disease.  Does not smoke or drink.  Is a retired lindsay.    ROS:    Denies fever/chills  Denies wt loss/gain  Denies HA or visual changes  Denies CP or palpitations  Denies SOB/cough/hemoptysis  Denies abd or flank pain  Denies N/V/D  Denies change in urinary habits or gross hematuria  Denies LE edema  Denies skin rashes/myalgias/arthralgias    PMH:  Past Medical History:    Atherosclerosis of coronary artery    Cancer (HCC)    bladder    Chronic renal insufficiency    Diabetes (HCC)    Heart attack (HCC)    High blood pressure    High cholesterol    Obesity    Stroke (HCC)    X2       PSH:  Past Surgical History:   Procedure Laterality Date    Angioplasty (coronary)  2016    stent right coronary artery X3    Other surgical history  2002 2003    2x knee replacements       Medications (Active prior to today's visit):  Current Outpatient Medications   Medication Sig Dispense Refill    ticagrelor 60 MG Oral Tab Take 1 tablet (60 mg total) by mouth 2 (two) times daily.      tamsulosin 0.4 MG Oral Cap Take 1 capsule (0.4 mg total) by mouth daily.      sodium bicarbonate 650 MG Oral Tab Take 1 tablet (650 mg total) by mouth daily.      dapagliflozin 5 MG Oral Tab Take 1 tablet (5 mg total) by mouth daily.      carvedilol 25 MG Oral Tab Take 1 tablet (25 mg total) by mouth 2 (two) times daily with meals.      aspirin 81 MG Oral Tab EC Take 1 tablet (81 mg total) by mouth daily.      rosuvastatin 5 MG Oral Tab Take 1 tablet (5 mg total) by mouth. Take 5 days per week.      nitroGLYCERIN 0.4 MG Sublingual SL Tab Place 0.4 mg under the tongue as needed.      ramipril 10 MG Oral Cap Take 1 capsule (10 mg total) by mouth 2 (two) times daily.      amLODIPine 5 MG Oral Tab Take 1 tablet (5 mg total) by mouth in the morning and 1 tablet (5 mg total) before bedtime.      glipiZIDE ER 5 MG Oral Tablet 24 Hr Take 1 tablet (5 mg total) by mouth daily with breakfast.         Allergies:  Allergies   Allergen  Reactions    Clopidogrel UNKNOWN and SWELLING     Other reaction(s): Swelling (localized)  Other reaction(s): Other (See Comments)  Cerner Allergy Text Annotation: Plavix, Cerner Reaction: swelling    Radiology Contrast Iodinated Dyes RENAL INSUFFICIENCY    Sulfamethoxazole SWELLING and OTHER (SEE COMMENTS)     Cerner Allergy Text Annotation: sulfamethoxazole, Cerner Reaction: swelling    Sulfa Antibiotics UNKNOWN       Social History:  Social History     Socioeconomic History    Marital status:    Tobacco Use    Smoking status: Never    Smokeless tobacco: Never   Substance and Sexual Activity    Alcohol use: No    Drug use: No        Family History:  Denies family history of kidney disease.    PHYSICAL EXAM:   /58 (BP Location: Left arm, Patient Position: Sitting)   Wt 214 lb 8 oz (97.3 kg)   BMI 33.60 kg/m²    Wt Readings from Last 3 Encounters:   04/29/24 214 lb 8 oz (97.3 kg)   10/23/23 217 lb (98.4 kg)   12/16/22 210 lb (95.3 kg)     General: Alert and oriented in no apparent distress.  HEENT: No scleral icterus, MMM  Neck: Supple, no JSOE D or thyromegaly  Cardiac: Regular rate and rhythm, S1, S2 normal, no murmur or rub  Lungs: Clear without wheezes, rales, rhonchi.    Abdomen: Soft, non-tender. + bowel sounds, no palpable organomegaly  Extremities: Without clubbing, cyanosis or edema.  Neurologic:  normal affect, cranial nerves grossly intact, moving all extremities  Skin: Warm and dry, no rashes        Kailyn Joy MD  10/23/2023  3:43 PM

## 2024-09-16 ENCOUNTER — HOSPITAL ENCOUNTER (EMERGENCY)
Age: 74
Discharge: HOME | End: 2024-09-16
Attending: EMERGENCY MEDICINE
Payer: MEDICARE

## 2024-09-16 ENCOUNTER — APPOINTMENT (OUTPATIENT)
Dept: CT IMAGING | Age: 74
End: 2024-09-16
Attending: EMERGENCY MEDICINE
Payer: MEDICARE

## 2024-09-16 VITALS
DIASTOLIC BLOOD PRESSURE: 65 MMHG | HEIGHT: 67 IN | TEMPERATURE: 98 F | RESPIRATION RATE: 16 BRPM | BODY MASS INDEX: 32.96 KG/M2 | OXYGEN SATURATION: 99 % | HEART RATE: 68 BPM | SYSTOLIC BLOOD PRESSURE: 136 MMHG | WEIGHT: 210 LBS

## 2024-09-16 DIAGNOSIS — R73.9 HYPERGLYCEMIA: ICD-10-CM

## 2024-09-16 DIAGNOSIS — N40.0 ENLARGED PROSTATE: Primary | ICD-10-CM

## 2024-09-16 DIAGNOSIS — N18.9 CHRONIC KIDNEY DISEASE, UNSPECIFIED CKD STAGE: ICD-10-CM

## 2024-09-16 DIAGNOSIS — R35.0 URINARY FREQUENCY: ICD-10-CM

## 2024-09-16 LAB
ALBUMIN SERPL-MCNC: 3.4 G/DL (ref 3.4–5)
ALBUMIN/GLOB SERPL: 1.1 {RATIO} (ref 1–2)
ALP LIVER SERPL-CCNC: 60 U/L
ALT SERPL-CCNC: 22 U/L
ANION GAP SERPL CALC-SCNC: 8 MMOL/L (ref 0–18)
AST SERPL-CCNC: 10 U/L (ref 15–37)
BASOPHILS # BLD AUTO: 0.04 X10(3) UL (ref 0–0.2)
BASOPHILS NFR BLD AUTO: 0.6 %
BILIRUB SERPL-MCNC: 0.6 MG/DL (ref 0.1–2)
BILIRUB UR QL STRIP.AUTO: NEGATIVE
BUN BLD-MCNC: 61 MG/DL (ref 9–23)
CALCIUM BLD-MCNC: 9.5 MG/DL (ref 8.5–10.1)
CHLORIDE SERPL-SCNC: 111 MMOL/L (ref 98–112)
CLARITY UR REFRACT.AUTO: CLEAR
CO2 SERPL-SCNC: 20 MMOL/L (ref 21–32)
COLOR UR AUTO: YELLOW
CREAT BLD-MCNC: 2.9 MG/DL
EGFRCR SERPLBLD CKD-EPI 2021: 22 ML/MIN/1.73M2 (ref 60–?)
EOSINOPHIL # BLD AUTO: 0.24 X10(3) UL (ref 0–0.7)
EOSINOPHIL NFR BLD AUTO: 3.8 %
ERYTHROCYTE [DISTWIDTH] IN BLOOD BY AUTOMATED COUNT: 14.8 %
GLOBULIN PLAS-MCNC: 3 G/DL (ref 2.8–4.4)
GLUCOSE BLD-MCNC: 197 MG/DL (ref 70–99)
GLUCOSE UR STRIP.AUTO-MCNC: 500 MG/DL
HCT VFR BLD AUTO: 42.2 %
HGB BLD-MCNC: 13.4 G/DL
IMM GRANULOCYTES # BLD AUTO: 0.04 X10(3) UL (ref 0–1)
IMM GRANULOCYTES NFR BLD: 0.6 %
KETONES UR STRIP.AUTO-MCNC: NEGATIVE MG/DL
LEUKOCYTE ESTERASE UR QL STRIP.AUTO: NEGATIVE
LYMPHOCYTES # BLD AUTO: 1.19 X10(3) UL (ref 1–4)
LYMPHOCYTES NFR BLD AUTO: 18.8 %
MCH RBC QN AUTO: 26.2 PG (ref 26–34)
MCHC RBC AUTO-ENTMCNC: 31.8 G/DL (ref 31–37)
MCV RBC AUTO: 82.4 FL
MONOCYTES # BLD AUTO: 0.59 X10(3) UL (ref 0.1–1)
MONOCYTES NFR BLD AUTO: 9.3 %
NEUTROPHILS # BLD AUTO: 4.23 X10 (3) UL (ref 1.5–7.7)
NEUTROPHILS # BLD AUTO: 4.23 X10(3) UL (ref 1.5–7.7)
NEUTROPHILS NFR BLD AUTO: 66.9 %
NITRITE UR QL STRIP.AUTO: NEGATIVE
OSMOLALITY SERPL CALC.SUM OF ELEC: 311 MOSM/KG (ref 275–295)
PH UR STRIP.AUTO: 5 [PH] (ref 5–8)
PLATELET # BLD AUTO: 165 10(3)UL (ref 150–450)
POTASSIUM SERPL-SCNC: 4 MMOL/L (ref 3.5–5.1)
PROT SERPL-MCNC: 6.4 G/DL (ref 6.4–8.2)
PROT UR STRIP.AUTO-MCNC: >=300 MG/DL
RBC # BLD AUTO: 5.12 X10(6)UL
SODIUM SERPL-SCNC: 139 MMOL/L (ref 136–145)
SP GR UR STRIP.AUTO: 1.02 (ref 1–1.03)
UROBILINOGEN UR STRIP.AUTO-MCNC: 0.2 MG/DL
WBC # BLD AUTO: 6.3 X10(3) UL (ref 4–11)

## 2024-09-16 PROCEDURE — 74176 CT ABD & PELVIS W/O CONTRAST: CPT | Performed by: EMERGENCY MEDICINE

## 2024-09-16 PROCEDURE — 85025 COMPLETE CBC W/AUTO DIFF WBC: CPT | Performed by: EMERGENCY MEDICINE

## 2024-09-16 PROCEDURE — 81015 MICROSCOPIC EXAM OF URINE: CPT | Performed by: EMERGENCY MEDICINE

## 2024-09-16 PROCEDURE — 96360 HYDRATION IV INFUSION INIT: CPT

## 2024-09-16 PROCEDURE — 99284 EMERGENCY DEPT VISIT MOD MDM: CPT

## 2024-09-16 PROCEDURE — 80053 COMPREHEN METABOLIC PANEL: CPT | Performed by: EMERGENCY MEDICINE

## 2024-09-16 PROCEDURE — 99285 EMERGENCY DEPT VISIT HI MDM: CPT

## 2024-09-16 PROCEDURE — 81001 URINALYSIS AUTO W/SCOPE: CPT | Performed by: EMERGENCY MEDICINE

## 2024-09-16 RX ORDER — SODIUM CHLORIDE 9 MG/ML
125 INJECTION, SOLUTION INTRAVENOUS CONTINUOUS
Status: DISCONTINUED | OUTPATIENT
Start: 2024-09-16 | End: 2024-09-16

## 2024-09-16 NOTE — ED PROVIDER NOTES
Patient Seen in: [facility] In [place]      History     Chief Complaint   Patient presents with    Kidney Problem     Stated Complaint: concerned his kidneys are getting worse    Subjective:   HPI    74-year-old male with history of chronic kidney disease, diabetes mellitus, hypertension, high cholesterol, coronary disease, presents emergency room for evaluation of urinary frequency.  Patient states that he has increased urinary frequency over the weekend, was told by his nephrologist that this potentially could be a sign of worsening kidney function therefore came to the ER for evaluation.  Patient states he has had some intermittent left lower back pain since yesterday, pain does not radiate.  Denies dysuria or urgency.  Denies hematuria.  Denies fevers or chills.  Denies chest pain or shortness of breath and denies abdominal pain.  Denies nausea vomiting or diarrhea.    Objective:   Past Medical History:    Atherosclerosis of coronary artery    Cancer (HCC)    bladder    Chronic renal insufficiency    Diabetes (HCC)    Heart attack (HCC)    High blood pressure    High cholesterol    Obesity    Stroke (HCC)    X2              Past Surgical History:   Procedure Laterality Date    Angioplasty (coronary)  2016    stent right coronary artery X3    Other surgical history  2002 2003    2x knee replacements                Social History     Socioeconomic History    Marital status:    Tobacco Use    Smoking status: Never    Smokeless tobacco: Never   Vaping Use    Vaping status: Never Used   Substance and Sexual Activity    Alcohol use: No    Drug use: No              Review of Systems    Positive for stated Chief Complaint: Kidney Problem    Other systems are as noted in HPI.  Constitutional and vital signs reviewed.      All other systems reviewed and negative except as noted above.    Physical Exam     ED Triage Vitals [09/16/24 0736]   /65   Pulse 63   Resp 18   Temp 97.5 °F (36.4  °C)   Temp src Oral   SpO2 99 %   O2 Device None (Room air)       Current Vitals:   Vital Signs  BP: 136/65  Pulse: 68  Resp: 16  Temp: 97.5 °F (36.4 °C)  Temp src: Oral    Oxygen Therapy  SpO2: 99 %  O2 Device: None (Room air)            Physical Exam    GENERAL: Patient is awake, alert, well-appearing, in no acute distress.  HEENT:  no scleral icterus.  Mucous membranes are moist, oropharynx is clear  Back: No midline thoracic or lumbar spine tenderness.  There is mild tenderness to the musculature to the left of the lumbar spine without erythema swelling or rash.  Patient reports same location of pain he has been experiencing.  HEART: Regular rate and rhythm, no murmurs.  LUNGS: Clear to auscultation bilaterally.  No Rales, no rhonchi, no wheezing, no stridor.  ABDOMEN: Soft, nondistended,non tender,  no rebound, no rigidity, no guarding.no pulsatile masses. No CVA tenderness  EXTREMITIES: No peripheral edema, no calf tenderness    ED Course     Labs Reviewed   URINALYSIS WITH CULTURE REFLEX - Abnormal; Notable for the following components:       Result Value    Glucose Urine 500 (*)     Blood Urine Small (*)     Protein Urine >=300 (*)     All other components within normal limits   UA MICROSCOPIC ONLY, URINE - Abnormal; Notable for the following components:    Bacteria Urine Rare (*)     Squamous Epi. Cells Few (*)     All other components within normal limits   COMP METABOLIC PANEL (14) - Abnormal; Notable for the following components:    Glucose 197 (*)     CO2 20.0 (*)     BUN 61 (*)     Creatinine 2.90 (*)     Calculated Osmolality 311 (*)     eGFR-Cr 22 (*)     AST 10 (*)     All other components within normal limits   CBC WITH DIFFERENTIAL WITH PLATELET   RAINBOW DRAW LAVENDER   RAINBOW DRAW LIGHT GREEN   RAINBOW DRAW BLUE                    MDM        Differential diagnosis before testing includes but not limited to ureterolithiasis  electrolyte abnormality, acute kidney injury, urinary infection which  is a medical condition that poses a threat to life/function    Radiographic images  I personally reviewed the radiographs and my individual interpretation shows CT, no kidney stones noted  I also reviewed the official reports that showed no hydronephrosis or nephrolithiasis.  Bladder wall thickening.  Enlarged prostate.    External chart review included GFR from 4/17/2024 was 20, creatinine 3.15 at that time      Course of Events during Emergency Room Visit include IV established blood work obtained.  Urinalysis negative nitrate leukocyte esterase, 500 glucose small blood.  CBC was unremarkable.  Chemistry sodium 139 potassium 4.0 BUN 61 creatinine 2.9 glucose 197.  CT of the abdomen/pelvis performed.  I discussed all results with the patient, suspect patient's symptoms are due to enlarged prostate which is present in the urinary bladder, patient does have chronic kidney disease, I recommend follow-up with primary care and his nephrologist.  Patient does have a urologist Dr. Tripp and was also instructed to follow-up with as well.  Return to ER if any change or worsening symptoms.  Patient well-appearing agrees plan discharge good condition    Shared decision making was utilized           Disposition:        Discharge  I have discussed with the patient the results of test, differential diagnosis, treatment plan, warning signs and symptoms which should prompt immediate return.  They expressed understanding of these instructions and agrees to the following plan provided.  They were given written discharge instructions and agrees to return for any concerns and voiced understanding and all questions were answered.    Note to patient: The 21st Century Cures Act makes medical notes like these available to patients in the interest of transparency. However, this is a medical document intended as peer to peer communication. It is written in medical language and may contain abbreviations or verbiage that are unfamiliar. It may  appear blunt or direct. Medical documents are intended to carry relevant information, facts as evident, and the clinical opinion of the practitioner.                                            Medical Decision Making      Disposition and Plan     Clinical Impression:  1. Enlarged prostate    2. Urinary frequency    3. Hyperglycemia    4. Chronic kidney disease, unspecified CKD stage         Disposition:  Discharge  9/16/2024  8:50 am    Follow-up:  Kailyn Joy MD  [address] [facility] [address]  [facility] [id number] [phone]    Call in 1 day(s)      Scooter Mason  [address] [facility] [address]  [facility] [id number] [phone]    Call in 1 day(s)            Medications Prescribed:  Discharge Medication List as of 9/16/2024  8:55 AM

## 2024-09-16 NOTE — ED INITIAL ASSESSMENT (HPI)
Urinary frequency this weekend with l sided lower back discomfort-- hx of kidney disease  Had teeth pulled last week and was taking amoxicillin

## 2024-10-19 ENCOUNTER — APPOINTMENT (OUTPATIENT)
Dept: GENERAL RADIOLOGY | Age: 74
End: 2024-10-19
Attending: EMERGENCY MEDICINE
Payer: MEDICARE

## 2024-10-19 ENCOUNTER — HOSPITAL ENCOUNTER (EMERGENCY)
Age: 74
Discharge: HOME | End: 2024-10-19
Attending: EMERGENCY MEDICINE
Payer: MEDICARE

## 2024-10-19 VITALS
HEART RATE: 62 BPM | RESPIRATION RATE: 18 BRPM | OXYGEN SATURATION: 98 % | SYSTOLIC BLOOD PRESSURE: 126 MMHG | TEMPERATURE: 98 F | DIASTOLIC BLOOD PRESSURE: 52 MMHG

## 2024-10-19 DIAGNOSIS — R00.2 PALPITATIONS: Primary | ICD-10-CM

## 2024-10-19 LAB
ALBUMIN SERPL-MCNC: 3.5 G/DL (ref 3.4–5)
ALBUMIN/GLOB SERPL: 1.2 {RATIO} (ref 1–2)
ALP LIVER SERPL-CCNC: 78 U/L
ALT SERPL-CCNC: 26 U/L
ANION GAP SERPL CALC-SCNC: 8 MMOL/L (ref 0–18)
AST SERPL-CCNC: 15 U/L (ref 15–37)
ATRIAL RATE: 60 BPM
BASOPHILS # BLD AUTO: 0.04 X10(3) UL (ref 0–0.2)
BASOPHILS NFR BLD AUTO: 0.7 %
BILIRUB SERPL-MCNC: 0.5 MG/DL (ref 0.1–2)
BILIRUB UR QL STRIP.AUTO: NEGATIVE
BUN BLD-MCNC: 78 MG/DL (ref 9–23)
CALCIUM BLD-MCNC: 9 MG/DL (ref 8.5–10.1)
CHLORIDE SERPL-SCNC: 110 MMOL/L (ref 98–112)
CLARITY UR REFRACT.AUTO: CLEAR
CO2 SERPL-SCNC: 20 MMOL/L (ref 21–32)
COLOR UR AUTO: COLORLESS
CREAT BLD-MCNC: 3.15 MG/DL
EGFRCR SERPLBLD CKD-EPI 2021: 20 ML/MIN/1.73M2 (ref 60–?)
EOSINOPHIL # BLD AUTO: 0.2 X10(3) UL (ref 0–0.7)
EOSINOPHIL NFR BLD AUTO: 3.5 %
ERYTHROCYTE [DISTWIDTH] IN BLOOD BY AUTOMATED COUNT: 14.5 %
GLOBULIN PLAS-MCNC: 3 G/DL (ref 2.8–4.4)
GLUCOSE BLD-MCNC: 121 MG/DL (ref 70–99)
GLUCOSE UR STRIP.AUTO-MCNC: 250 MG/DL
HCT VFR BLD AUTO: 39.8 %
HGB BLD-MCNC: 13.6 G/DL
IMM GRANULOCYTES # BLD AUTO: 0.04 X10(3) UL (ref 0–1)
IMM GRANULOCYTES NFR BLD: 0.7 %
KETONES UR STRIP.AUTO-MCNC: NEGATIVE MG/DL
LEUKOCYTE ESTERASE UR QL STRIP.AUTO: NEGATIVE
LYMPHOCYTES # BLD AUTO: 0.97 X10(3) UL (ref 1–4)
LYMPHOCYTES NFR BLD AUTO: 17.1 %
MCH RBC QN AUTO: 28.2 PG (ref 26–34)
MCHC RBC AUTO-ENTMCNC: 34.2 G/DL (ref 31–37)
MCV RBC AUTO: 82.4 FL
MONOCYTES # BLD AUTO: 0.66 X10(3) UL (ref 0.1–1)
MONOCYTES NFR BLD AUTO: 11.6 %
NEUTROPHILS # BLD AUTO: 3.76 X10 (3) UL (ref 1.5–7.7)
NEUTROPHILS # BLD AUTO: 3.76 X10(3) UL (ref 1.5–7.7)
NEUTROPHILS NFR BLD AUTO: 66.4 %
NITRITE UR QL STRIP.AUTO: NEGATIVE
OSMOLALITY SERPL CALC.SUM OF ELEC: 311 MOSM/KG (ref 275–295)
P AXIS: 54 DEGREES
P-R INTERVAL: 304 MS
PH UR STRIP.AUTO: 5 [PH] (ref 5–8)
PLATELET # BLD AUTO: 194 10(3)UL (ref 150–450)
POTASSIUM SERPL-SCNC: 4.1 MMOL/L (ref 3.5–5.1)
PROT SERPL-MCNC: 6.5 G/DL (ref 6.4–8.2)
Q-T INTERVAL: 466 MS
QRS DURATION: 182 MS
QTC CALCULATION (BEZET): 466 MS
R AXIS: -69 DEGREES
RBC # BLD AUTO: 4.83 X10(6)UL
SARS-COV-2 RNA RESP QL NAA+PROBE: NOT DETECTED
SODIUM SERPL-SCNC: 138 MMOL/L (ref 136–145)
SP GR UR STRIP.AUTO: 1.01 (ref 1–1.03)
T AXIS: 108 DEGREES
TROPONIN I SERPL HS-MCNC: 13 NG/L
UROBILINOGEN UR STRIP.AUTO-MCNC: 0.2 MG/DL
VENTRICULAR RATE: 60 BPM
WBC # BLD AUTO: 5.7 X10(3) UL (ref 4–11)

## 2024-10-19 PROCEDURE — 93005 ELECTROCARDIOGRAM TRACING: CPT

## 2024-10-19 PROCEDURE — 81015 MICROSCOPIC EXAM OF URINE: CPT | Performed by: EMERGENCY MEDICINE

## 2024-10-19 PROCEDURE — 87040 BLOOD CULTURE FOR BACTERIA: CPT | Performed by: EMERGENCY MEDICINE

## 2024-10-19 PROCEDURE — 85025 COMPLETE CBC W/AUTO DIFF WBC: CPT | Performed by: EMERGENCY MEDICINE

## 2024-10-19 PROCEDURE — 99285 EMERGENCY DEPT VISIT HI MDM: CPT

## 2024-10-19 PROCEDURE — 99284 EMERGENCY DEPT VISIT MOD MDM: CPT

## 2024-10-19 PROCEDURE — 80053 COMPREHEN METABOLIC PANEL: CPT | Performed by: EMERGENCY MEDICINE

## 2024-10-19 PROCEDURE — 71045 X-RAY EXAM CHEST 1 VIEW: CPT | Performed by: EMERGENCY MEDICINE

## 2024-10-19 PROCEDURE — 93010 ELECTROCARDIOGRAM REPORT: CPT

## 2024-10-19 PROCEDURE — 84484 ASSAY OF TROPONIN QUANT: CPT | Performed by: EMERGENCY MEDICINE

## 2024-10-19 PROCEDURE — 81001 URINALYSIS AUTO W/SCOPE: CPT | Performed by: EMERGENCY MEDICINE

## 2024-10-19 PROCEDURE — 36415 COLL VENOUS BLD VENIPUNCTURE: CPT

## 2024-10-19 NOTE — ED INITIAL ASSESSMENT (HPI)
Patient reports he was \"cutting a pepper and felt this weird sensation, felt like I was going to pass out and felt my heart racing.\" Patient denies LOC, alert and oriented, speaking in full complete sentences. Reports weakness, denies chest  pain, denies SOB. Patient states he took 2 nitroglycerin tablets PTA.

## 2024-10-19 NOTE — ED PROVIDER NOTES
Patient Seen in: [facility] [place]      History     Chief Complaint   Patient presents with    Dizziness     Stated Complaint: weakness, HTN    Subjective:   HPI      This is a 74-year-old male past medical history of coronary disease status post 4 stents on ticagrelor , bladder cancer, BPH, diabetes type 2, hypertension, hyperlipidemia, stroke, CKD stage IV, urine culture that grew out enterococcal cystitis on 10/5/2024 amoxicillin on Keflex initially preceding culture results who presents to the emergency room complaining of weakness and just not feeling well.  He was due to have a urologic procedure at [facility] this Wednesday but due to him not feeling well they canceled it.  Patient reports he was standing in the kitchen cutting of a cover when he suddenly felt like he was in a pass out.  He felt like his heart was racing.  He was able to sit down.  He was nauseated.  There was no chest pain, shortness of breath vomiting or diaphoresis.  The symptoms lasted approximately 30 minutes.  He did not check his heart rate but he folic it was racing.  He states his blood pressure was 250/101 when this event occurred so he took 2 nitroglycerin.  He does not smoke tobacco does not drink alcohol.  He restarted his Brilinta  today procedure this week which has not been canceled.  He presents here for further evaluation.    Objective:     Past Medical History:    Atherosclerosis of coronary artery    Cancer (HCC)    bladder    Chronic renal insufficiency    Diabetes (HCC)    Heart attack (HCC)    High blood pressure    High cholesterol    Obesity    Stroke (HCC)    X2              Past Surgical History:   Procedure Laterality Date    Angioplasty (coronary)  2016    stent right coronary artery X3    Other surgical history  2002 2003    2x knee replacements                Social History     Socioeconomic History    Marital status:    Tobacco Use    Smoking status: Never    Smokeless  tobacco: Never   Vaping Use    Vaping status: Never Used   Substance and Sexual Activity    Alcohol use: No    Drug use: No                  Physical Exam     ED Triage Vitals   BP 10/19/24 1333 (!) 178/72   Pulse 10/19/24 1333 64   Resp 10/19/24 1333 15   Temp 10/19/24 1510 98.3 °F (36.8 °C)   Temp src 10/19/24 1510 Temporal   SpO2 10/19/24 1333 97 %   O2 Device 10/19/24 1333 None (Room air)       Current Vitals:   Vital Signs  BP: 126/52  Pulse: 62  Resp: 18  Temp: 98.2 °F (36.8 °C)  Temp src: Temporal    Oxygen Therapy  SpO2: 98 %  O2 Device: None (Room air)        Physical Exam  GENERAL: Awake, alert oriented x3, nontoxic appearing.   SKIN: Normal, warm, and dry.  HEENT:  Pupils equally round and reactive to light. Conjuctiva clear.  Oropharynx is clear and moist.   Lungs: Clear to auscultation bilaterally with no rales, no retractions, and no wheezing.  HEART:  Regular rate and rhythm. S1 and S2. No murmurs, no rubs or gallops.   ABDOMEN: Soft, nontender and nondistended. Normoactive bowel sounds. No rebound. No guarding.   EXTREMITIES: Warm with brisk capillary refill.         ED Course     Labs Reviewed   CBC WITH DIFFERENTIAL WITH PLATELET - Abnormal; Notable for the following components:       Result Value    Lymphocyte Absolute 0.97 (*)     All other components within normal limits   COMP METABOLIC PANEL (14) - Abnormal; Notable for the following components:    Glucose 121 (*)     CO2 20.0 (*)     BUN 78 (*)     Creatinine 3.15 (*)     Calculated Osmolality 311 (*)     eGFR-Cr 20 (*)     All other components within normal limits   URINALYSIS WITH CULTURE REFLEX - Abnormal; Notable for the following components:    Urine Color Colorless (*)     Glucose Urine 250 (*)     Blood Urine Trace-Intact (*)     Protein Urine 100 mg/dL (*)     All other components within normal limits   TROPONIN I HIGH SENSITIVITY - Normal   UA MICROSCOPIC ONLY, URINE - Normal   RAPID SARS-COV-2 BY PCR - Normal   RAINBOW DRAW LAVENDER    RAINBOW DRAW LIGHT GREEN   RAINBOW DRAW BLUE   BLOOD CULTURE   BLOOD CULTURE     EKG    Rate, intervals and axes as noted on EKG Report.  Rate: 60  Rhythm: Paced  Reading: No acute changes                       MDM           This is a 74-year-old male past medical history of coronary disease status post 4 stents on ticagrelor , bladder cancer, BPH, diabetes type 2, hypertension, hyperlipidemia, stroke, CKD stage IV, urine culture that grew out enterococcal cystitis on 10/5/2024 amoxicillin on Keflex initially preceding culture results who presents to the emergency room complaining of weakness and just not feeling well.  He was due to have a urologic procedure at [facility] this Wednesday but due to him not feeling well they canceled it.  Patient reports he was standing in the kitchen cutting of a cover when he suddenly felt like he was in a pass out.  He felt like his heart was racing.  He was able to sit down.  He was nauseated.  Differential includes tachyarrhythmia, hypertensive urgency, infectious etiology.    Patient placed on cardiac monitor, continuous pulse oximetry and IV line was established of normal saline.  Basic labs were obtained.  CBC: White blood cell count 5.7.  Hemoglobin 13.6.  Platelet 194.  CMP: BUN 78.  Creatinine 3.15.  Glucose 121.  Bicarb 20.  Electrolytes are normal.  Liver functions normal.  Troponin negative.      Blood culture sent x 2 and pending.    Rapid COVID: Negative.  Independently viewed the chest x-ray which shows no acute process.      Patient was ambulated with no abrupt changes in blood pressures.  He had no recurrence of symptoms.  He currently feels fine.    Discussed case with [facility] cardiology patient get the office information observation or outpatient follow-up on Monday with cardiac monitor.    Patient states he would much prefer to go home.  He will return if any new or worsening symptoms.  Continue regular medications.  [facility] cardiology will contact him on Monday and  then after the monitor is worn he will follow-up with Dr. Carson.  Patient discharged home in good condition with his wife       Reviewed cardiology office note as below 10/1/2024 by Dr. Garcia from [facility].  Family reports that Dr. Carson is her new cardiologist because Dr. Garcia is retiring.  He has been stented previously by Dr. Carson.  Retiring.  Mixed hyperlipidemia (Primary Dx);  Family history of ischemic heart disease;  History of recurrent TIAs;  S/P angioplasty with stent;  Hypertensive heart disease without heart failure;  S/P angioplasty; 5/11/16 mid RCA 95%; Xience 3x18 mm stent; [facility];  Pre-operative cardiovascular examination;  S/P drug eluting coronary stent placement 12/20/22 distal RCA @ [facility]; dilated stents in RCA; 30-40% noted in mid LAD;  Chronic coronary artery disease;  S/P primary angioplasty with coronary stent; 4/9/15: mild prox LAD, severe RCA in stent stenosis -->medicated stent in RCA Rx Brilinta for one year, aspirin for lifetime.;  DM (diabetes mellitus), type 2 with complications (CMS-HCC);  Adverse effect of drug, sequela       Disposition and Plan     Clinical Impression:  1. Palpitations         Disposition:  Discharge  10/19/2024  4:11 pm    Follow-up:  Awais Carson MD  [facility] [address] [id number] [phone]    Follow up in 1 week(s)  Our office will call to schedule appointment for cardiac monitor and office visit          Medications Prescribed:  Discharge Medication List as of 10/19/2024  4:13 PM              Supplementary Documentation:

## 2024-10-19 NOTE — DISCHARGE INSTRUCTIONS
Continue regular medications  Return if worse  Follow-up with cardiology on Monday, they will contact you to place a heart monitor.  He will have to go get it placed.  Then they will set you up with a follow-up appoint with Dr. Carson after you have wore the monitor

## 2024-10-19 NOTE — ED QUICK NOTES
Patient ambulated well without any issue. Patient did not feel pain before or after ambulation. Notifed MD.

## 2024-11-07 ENCOUNTER — OFFICE VISIT (OUTPATIENT)
Dept: NEPHROLOGY | Facility: CLINIC | Age: 74
End: 2024-11-07
Payer: MEDICARE

## 2024-11-07 VITALS — WEIGHT: 204.5 LBS | SYSTOLIC BLOOD PRESSURE: 120 MMHG | BODY MASS INDEX: 32 KG/M2 | DIASTOLIC BLOOD PRESSURE: 56 MMHG

## 2024-11-07 DIAGNOSIS — E11.21 DIABETIC NEPHROPATHY ASSOCIATED WITH TYPE 2 DIABETES MELLITUS (HCC): ICD-10-CM

## 2024-11-07 DIAGNOSIS — I10 PRIMARY HYPERTENSION: ICD-10-CM

## 2024-11-07 DIAGNOSIS — N18.4 CKD (CHRONIC KIDNEY DISEASE) STAGE 4, GFR 15-29 ML/MIN (HCC): Primary | ICD-10-CM

## 2024-11-07 PROCEDURE — 99214 OFFICE O/P EST MOD 30 MIN: CPT | Performed by: INTERNAL MEDICINE

## 2024-11-07 NOTE — PROGRESS NOTES
Nephrology Progress Note      ASSESSMENT/PLAN:      1) CKD 4- SCr 3.1 mg/dl (eGFR 20 ml/min- Cr 2.0 mg/dl 2019; 2.6 10/21) presumably due to longstanding diabetes and hypertension and is currently being evaluated for kidney transplant.  He has been seen by Dr. Mason over the last 8 to 10 years and apparently underwent a kidney biopsy prior to this.  May have received glucocorticoids initially but this was discontinued by Dr. Sheets at [facility].  Electrolytes, acid-base and volume status acceptable; he is not anemic.  There is no indication for renal replacement therapy at this time.  BP and diabetes are well controlled. PLAN- d/w pt and wife at length. Remains relatively asymptomatic; electrolytes acid-base and volume status are acceptable.  Is unlikely to reach ESRD within the next 2-3 yrs. Recheck labs and f/u in 6 months.     2) Anemia- hgb 13 g/dl- no need for EPO until hgb < 10 g/dl while Fe replete    3) DM 2 x 15-20 yrs- well controlled on glipizide + farxiga (A1C approx 7)    4) CAD s/p yearly stents x 5 last 12/22 on brilinta / statin / BB / ASA    5) BPH- upcoming procedure with     Was turned down for transplant at [facility] due to cardiac risk despite preserved EF + negative lexiscan.        HPI:   Kin Guerra is a 74 year old male with   Chief Complaint   Patient presents with    Chronic Kidney Disease    Hypertension     TRISH NAIK MD    Very pleasant 74-year-old male presents for evaluation of chronic kidney disease; had been seeing Dr. Mason x yrs and recent evaluated for kidney transplantation.  History told him for potential donors but he prefer not to ask them.  Underwent a kidney biopsy 10 years ago thought to have possible glomerular disease and was initially started on steroids but this was discontinued by nephrologist at [facility] as above.  No history of acute renal failure, gross microscopic hematuria proteinuria kidney stones or infections.  No history of congestive heart  failure.  He does have at the pacemaker.  No history of lung or liver disease.  Does not smoke or drink.  Is a retired lindsay.    ROS:    Denies fever/chills  Denies wt loss/gain  Denies HA or visual changes  Denies CP or palpitations  Denies SOB/cough/hemoptysis  Denies abd or flank pain  Denies N/V/D  Denies change in urinary habits or gross hematuria  Denies LE edema  Denies skin rashes/myalgias/arthralgias    PMH:  Past Medical History:    Atherosclerosis of coronary artery    Cancer (HCC)    bladder    Chronic renal insufficiency    Diabetes (HCC)    Heart attack (HCC)    High blood pressure    High cholesterol    Obesity    Stroke (HCC)    X2       PSH:  Past Surgical History:   Procedure Laterality Date    Angioplasty (coronary)  2016    stent right coronary artery X3    Other surgical history  2002 2003    2x knee replacements       Medications (Active prior to today's visit):  Current Outpatient Medications   Medication Sig Dispense Refill    ticagrelor 60 MG Oral Tab Take 1 tablet (60 mg total) by mouth 2 (two) times daily.      tamsulosin 0.4 MG Oral Cap Take 1 capsule (0.4 mg total) by mouth daily.      sodium bicarbonate 650 MG Oral Tab Take 1 tablet (650 mg total) by mouth daily.      dapagliflozin 5 MG Oral Tab Take 1 tablet (5 mg total) by mouth daily.      carvedilol 25 MG Oral Tab Take 1 tablet (25 mg total) by mouth 2 (two) times daily with meals.      aspirin 81 MG Oral Tab EC Take 1 tablet (81 mg total) by mouth daily.      rosuvastatin 5 MG Oral Tab Take 1 tablet (5 mg total) by mouth. Take 5 days per week.      nitroGLYCERIN 0.4 MG Sublingual SL Tab Place 1 tablet (0.4 mg total) under the tongue as needed.      ramipril 10 MG Oral Cap Take 1 capsule (10 mg total) by mouth 2 (two) times daily.      amLODIPine 5 MG Oral Tab Take 1 tablet (5 mg total) by mouth in the morning and 1 tablet (5 mg total) before bedtime.      glipiZIDE ER 5 MG Oral Tablet 24 Hr Take 1 tablet (5 mg total) by mouth  daily with breakfast.         Allergies:  Allergies   Allergen Reactions    Clopidogrel UNKNOWN and SWELLING     Other reaction(s): Swelling (localized)  Other reaction(s): Other (See Comments)  Cerner Allergy Text Annotation: Plavix, Cerner Reaction: swelling    Radiology Contrast Iodinated Dyes RENAL INSUFFICIENCY    Sulfamethoxazole SWELLING and OTHER (SEE COMMENTS)     Cerner Allergy Text Annotation: sulfamethoxazole, Cerner Reaction: swelling    Sulfa Antibiotics UNKNOWN       Social History:  Social History     Socioeconomic History    Marital status:    Tobacco Use    Smoking status: Never    Smokeless tobacco: Never   Vaping Use    Vaping status: Never Used   Substance and Sexual Activity    Alcohol use: No    Drug use: No        Family History:  Denies family history of kidney disease.    PHYSICAL EXAM:   There were no vitals taken for this visit.   Wt Readings from Last 3 Encounters:   09/16/24 210 lb (95.3 kg)   04/29/24 214 lb 8 oz (97.3 kg)   10/23/23 217 lb (98.4 kg)     General: Alert and oriented in no apparent distress.  HEENT: No scleral icterus, MMM  Neck: Supple, no JOSE D or thyromegaly  Cardiac: Regular rate and rhythm, S1, S2 normal, no murmur or rub  Lungs: Clear without wheezes, rales, rhonchi.    Abdomen: Soft, non-tender. + bowel sounds, no palpable organomegaly  Extremities: Without clubbing, cyanosis or edema.  Neurologic:  normal affect, cranial nerves grossly intact, moving all extremities  Skin: Warm and dry, no rashes        Kailyn Joy MD  11/7/2024  137 PM

## 2024-12-05 ENCOUNTER — HOSPITAL ENCOUNTER (OUTPATIENT)
Dept: LAB | Facility: HOSPITAL | Age: 74
Discharge: HOME | End: 2024-12-05
Attending: NURSE PRACTITIONER
Payer: MEDICARE

## 2024-12-05 LAB — NT-PROBNP SERPL-MCNC: 570 PG/ML (ref ?–450)

## 2024-12-05 PROCEDURE — 36415 COLL VENOUS BLD VENIPUNCTURE: CPT | Performed by: NURSE PRACTITIONER

## 2024-12-05 PROCEDURE — 83880 ASSAY OF NATRIURETIC PEPTIDE: CPT | Performed by: NURSE PRACTITIONER

## 2025-04-25 ENCOUNTER — HOSPITAL ENCOUNTER (OUTPATIENT)
Dept: LAB | Facility: HOSPITAL | Age: 75
Discharge: HOME | End: 2025-04-25
Attending: INTERNAL MEDICINE
Payer: MEDICARE

## 2025-04-25 LAB
ANION GAP SERPL CALC-SCNC: 9 MMOL/L (ref 0–18)
BASOPHILS # BLD AUTO: 0.03 X10(3) UL (ref 0–0.2)
BASOPHILS NFR BLD AUTO: 0.5 %
BUN BLD-MCNC: 47 MG/DL (ref 9–23)
CALCIUM BLD-MCNC: 10.1 MG/DL (ref 8.7–10.6)
CHLORIDE SERPL-SCNC: 108 MMOL/L (ref 98–112)
CO2 SERPL-SCNC: 24 MMOL/L (ref 21–32)
CREAT BLD-MCNC: 3.28 MG/DL (ref 0.7–1.3)
EGFRCR SERPLBLD CKD-EPI 2021: 19 ML/MIN/1.73M2 (ref 60–?)
EOSINOPHIL # BLD AUTO: 0.27 X10(3) UL (ref 0–0.7)
EOSINOPHIL NFR BLD AUTO: 4.8 %
ERYTHROCYTE [DISTWIDTH] IN BLOOD BY AUTOMATED COUNT: 14.7 %
FASTING STATUS PATIENT QL REPORTED: NO
GLUCOSE BLD-MCNC: 144 MG/DL (ref 70–99)
HCT VFR BLD AUTO: 41.9 % (ref 39–53)
HGB BLD-MCNC: 13.5 G/DL (ref 13–17.5)
IMM GRANULOCYTES # BLD AUTO: 0.03 X10(3) UL (ref 0–1)
IMM GRANULOCYTES NFR BLD: 0.5 %
LYMPHOCYTES # BLD AUTO: 1.11 X10(3) UL (ref 1–4)
LYMPHOCYTES NFR BLD AUTO: 19.7 %
MCH RBC QN AUTO: 26.9 PG (ref 26–34)
MCHC RBC AUTO-ENTMCNC: 32.2 G/DL (ref 31–37)
MCV RBC AUTO: 83.6 FL (ref 80–100)
MONOCYTES # BLD AUTO: 0.56 X10(3) UL (ref 0.1–1)
MONOCYTES NFR BLD AUTO: 9.9 %
NEUTROPHILS # BLD AUTO: 3.64 X10 (3) UL (ref 1.5–7.7)
NEUTROPHILS # BLD AUTO: 3.64 X10(3) UL (ref 1.5–7.7)
NEUTROPHILS NFR BLD AUTO: 64.6 %
OSMOLALITY SERPL CALC.SUM OF ELEC: 307 MOSM/KG (ref 275–295)
PHOSPHATE SERPL-MCNC: 3.7 MG/DL (ref 2.4–5.1)
PLATELET # BLD AUTO: 179 10(3)UL (ref 150–450)
PLATELETS.RETICULATED NFR BLD AUTO: 2.6 % (ref 0–7)
POTASSIUM SERPL-SCNC: 4.2 MMOL/L (ref 3.5–5.1)
RBC # BLD AUTO: 5.01 X10(6)UL (ref 3.8–5.8)
SODIUM SERPL-SCNC: 141 MMOL/L (ref 136–145)
WBC # BLD AUTO: 5.6 X10(3) UL (ref 4–11)

## 2025-04-25 PROCEDURE — 80048 BASIC METABOLIC PNL TOTAL CA: CPT | Performed by: INTERNAL MEDICINE

## 2025-04-25 PROCEDURE — 84100 ASSAY OF PHOSPHORUS: CPT | Performed by: INTERNAL MEDICINE

## 2025-04-25 PROCEDURE — 85025 COMPLETE CBC W/AUTO DIFF WBC: CPT | Performed by: INTERNAL MEDICINE

## 2025-04-25 PROCEDURE — 36415 COLL VENOUS BLD VENIPUNCTURE: CPT | Performed by: INTERNAL MEDICINE

## 2025-05-07 ENCOUNTER — OFFICE VISIT (OUTPATIENT)
Dept: NEPHROLOGY | Facility: CLINIC | Age: 75
End: 2025-05-07
Payer: MEDICARE

## 2025-05-07 VITALS — WEIGHT: 208.38 LBS | BODY MASS INDEX: 33 KG/M2 | SYSTOLIC BLOOD PRESSURE: 154 MMHG | DIASTOLIC BLOOD PRESSURE: 70 MMHG

## 2025-05-07 DIAGNOSIS — N18.4 CKD (CHRONIC KIDNEY DISEASE) STAGE 4, GFR 15-29 ML/MIN (HCC): Primary | ICD-10-CM

## 2025-05-07 DIAGNOSIS — E11.21 DIABETIC NEPHROPATHY ASSOCIATED WITH TYPE 2 DIABETES MELLITUS (HCC): ICD-10-CM

## 2025-05-07 PROCEDURE — 1160F RVW MEDS BY RX/DR IN RCRD: CPT | Performed by: INTERNAL MEDICINE

## 2025-05-07 PROCEDURE — 3078F DIAST BP <80 MM HG: CPT | Performed by: INTERNAL MEDICINE

## 2025-05-07 PROCEDURE — 99214 OFFICE O/P EST MOD 30 MIN: CPT | Performed by: INTERNAL MEDICINE

## 2025-05-07 PROCEDURE — 1159F MED LIST DOCD IN RCRD: CPT | Performed by: INTERNAL MEDICINE

## 2025-05-07 PROCEDURE — 3044F HG A1C LEVEL LT 7.0%: CPT | Performed by: INTERNAL MEDICINE

## 2025-05-07 PROCEDURE — 3077F SYST BP >= 140 MM HG: CPT | Performed by: INTERNAL MEDICINE

## 2025-05-07 NOTE — PROGRESS NOTES
Nephrology Progress Note      ASSESSMENT/PLAN:      1) CKD 4- SCr 3.2 mg/dl (eGFR 20 ml/min- Cr 2.0 mg/dl 2019; 2.6 10/21) presumably due to longstanding diabetes and hypertension.  He has been seen by Dr. Mason over the last 8 to 10 years and apparently underwent a kidney biopsy prior to this.  May have received glucocorticoids initially but this was discontinued by Dr. Sheets at [facility].  Electrolytes, acid-base and volume status acceptable; he is not anemic.  There is no indication for renal replacement therapy at this time.  BP and diabetes are well controlled. PLAN- d/w pt at length. Remains relatively asymptomatic; electrolytes acid-base and volume status are acceptable.  Is unlikely to reach ESRD within the next 2-3 yrs. Recheck labs in 6 months and f/u in 1 yr.     2) Anemia- hgb 13 g/dl- no need for EPO until hgb < 10 g/dl while Fe replete    3) DM 2 x 15-20 yrs- well controlled on glipizide + farxiga (A1C 6.8)    4) CAD s/p yearly stents x 5 last 12/22 on brilinta / statin / BB / ASA    5) BPH- s/p recent prostate surgery (feels memory impairment post anesthesia, also very inactive x 1 month post-op)    Was turned down for transplant at [facility] due to cardiac risk despite preserved EF + negative lexiscan.        HPI:   Kin Guerra is a 74 year old male with   Chief Complaint   Patient presents with    Chronic Kidney Disease    Hypertension     TRISH NAIK MD    Very pleasant 74-year-old male presents for follow-up of chronic kidney disease; doing well overall other than a slow recovery from recent prostate surgery.  Feels that his memory is not quite as good is also physically weaker as he was laid up for about a month after surgery.  Now looking forward to the golf season.  Meds are unchanged.    ROS:    Denies fever/chills  Denies wt loss/gain  Denies HA or visual changes  Denies CP or palpitations  Denies SOB/cough/hemoptysis  Denies abd or flank pain  Denies N/V/D  Denies change in urinary habits  or gross hematuria  Denies LE edema  Denies skin rashes/myalgias/arthralgias    PMH:  Past Medical History:    Atherosclerosis of coronary artery    Cancer (HCC)    bladder    Chronic renal insufficiency    Diabetes (HCC)    Heart attack (HCC)    High blood pressure    High cholesterol    Obesity    Stroke (HCC)    X2       PSH:  Past Surgical History:   Procedure Laterality Date    Angioplasty (coronary)  2016    stent right coronary artery X3    Other surgical history  2002 2003    2x knee replacements       Medications (Active prior to today's visit):  Current Outpatient Medications   Medication Sig Dispense Refill    ticagrelor 60 MG Oral Tab Take 1 tablet (60 mg total) by mouth 2 (two) times daily.      tamsulosin 0.4 MG Oral Cap Take 1 capsule (0.4 mg total) by mouth daily.      sodium bicarbonate 650 MG Oral Tab Take 1 tablet (650 mg total) by mouth daily.      dapagliflozin 5 MG Oral Tab Take 1 tablet (5 mg total) by mouth daily.      carvedilol 25 MG Oral Tab Take 1 tablet (25 mg total) by mouth 2 (two) times daily with meals.      aspirin 81 MG Oral Tab EC Take 1 tablet (81 mg total) by mouth daily.      rosuvastatin 5 MG Oral Tab Take 1 tablet (5 mg total) by mouth. Take 5 days per week.      nitroGLYCERIN 0.4 MG Sublingual SL Tab Place 1 tablet (0.4 mg total) under the tongue as needed.      ramipril 10 MG Oral Cap Take 1 capsule (10 mg total) by mouth 2 (two) times daily.      amLODIPine 5 MG Oral Tab Take 1 tablet (5 mg total) by mouth in the morning and 1 tablet (5 mg total) before bedtime.      glipiZIDE ER 5 MG Oral Tablet 24 Hr Take 1 tablet (5 mg total) by mouth daily with breakfast.         Allergies:  Allergies   Allergen Reactions    Clopidogrel UNKNOWN and SWELLING     Other reaction(s): Swelling (localized)  Other reaction(s): Other (See Comments)  Cerner Allergy Text Annotation: Plavix, Cerner Reaction: swelling    Radiology Contrast Iodinated Dyes RENAL INSUFFICIENCY    Sulfamethoxazole  SWELLING and OTHER (SEE COMMENTS)     Cerner Allergy Text Annotation: sulfamethoxazole, Cerner Reaction: swelling    Sulfa Antibiotics UNKNOWN       Social History:  Social History     Socioeconomic History    Marital status:    Tobacco Use    Smoking status: Never    Smokeless tobacco: Never   Vaping Use    Vaping status: Never Used   Substance and Sexual Activity    Alcohol use: No    Drug use: No        Family History:  Denies family history of kidney disease.    PHYSICAL EXAM:   /70 (BP Location: Left arm, Patient Position: Sitting)   Wt 208 lb 6 oz (94.5 kg)   BMI 32.64 kg/m²    Wt Readings from Last 3 Encounters:   05/07/25 208 lb 6 oz (94.5 kg)   11/07/24 204 lb 8 oz (92.8 kg)   09/16/24 210 lb (95.3 kg)     General: Alert and oriented in no apparent distress.  HEENT: No scleral icterus, MMM  Neck: Supple, no JOSE D or thyromegaly  Cardiac: Regular rate and rhythm, S1, S2 normal, no murmur or rub  Lungs: Clear without wheezes, rales, rhonchi.    Abdomen: Soft, non-tender. + bowel sounds, no palpable organomegaly  Extremities: Without clubbing, cyanosis or edema.  Neurologic:  normal affect, cranial nerves grossly intact, moving all extremities  Skin: Warm and dry, no rashes        Kailyn Joy MD  11/8/2024  129 PM

## 2025-06-26 ENCOUNTER — HOSPITAL ENCOUNTER (OUTPATIENT)
Facility: HOSPITAL | Age: 75
Setting detail: OBSERVATION
Discharge: HOME | End: 2025-06-27
Attending: EMERGENCY MEDICINE | Admitting: STUDENT IN AN ORGANIZED HEALTH CARE EDUCATION/TRAINING PROGRAM
Payer: MEDICARE

## 2025-06-26 ENCOUNTER — APPOINTMENT (OUTPATIENT)
Dept: GENERAL RADIOLOGY | Age: 75
End: 2025-06-26
Attending: EMERGENCY MEDICINE
Payer: MEDICARE

## 2025-06-26 ENCOUNTER — APPOINTMENT (OUTPATIENT)
Dept: INTERVENTIONAL RADIOLOGY/VASCULAR | Facility: HOSPITAL | Age: 75
End: 2025-06-26
Payer: MEDICARE

## 2025-06-26 DIAGNOSIS — R07.9 CHEST PAIN, RULE OUT ACUTE MYOCARDIAL INFARCTION: Primary | ICD-10-CM

## 2025-06-26 PROBLEM — I10 PRIMARY HYPERTENSION: Status: ACTIVE | Noted: 2018-08-14

## 2025-06-26 PROBLEM — N18.4 STAGE 4 CHRONIC KIDNEY DISEASE (HCC): Status: ACTIVE | Noted: 2025-06-26

## 2025-06-26 PROBLEM — E87.20 METABOLIC ACIDOSIS: Status: ACTIVE | Noted: 2025-06-26

## 2025-06-26 LAB
ALBUMIN SERPL-MCNC: 4.4 G/DL (ref 3.2–4.8)
ALBUMIN/GLOB SERPL: 2 {RATIO} (ref 1–2)
ALP LIVER SERPL-CCNC: 99 U/L (ref 45–117)
ALT SERPL-CCNC: 11 U/L (ref 10–49)
ANION GAP SERPL CALC-SCNC: 9 MMOL/L (ref 0–18)
AST SERPL-CCNC: 17 U/L (ref ?–34)
ATRIAL RATE: 60 BPM
BASOPHILS # BLD AUTO: 0.05 X10(3) UL (ref 0–0.2)
BASOPHILS NFR BLD AUTO: 0.7 %
BILIRUB SERPL-MCNC: 0.2 MG/DL (ref 0.2–1.1)
BUN BLD-MCNC: 47 MG/DL (ref 9–23)
CALCIUM BLD-MCNC: 9.4 MG/DL (ref 8.7–10.6)
CHLORIDE SERPL-SCNC: 110 MMOL/L (ref 98–112)
CO2 SERPL-SCNC: 19 MMOL/L (ref 21–32)
CREAT BLD-MCNC: 3.16 MG/DL (ref 0.7–1.3)
EGFRCR SERPLBLD CKD-EPI 2021: 20 ML/MIN/1.73M2 (ref 60–?)
EOSINOPHIL # BLD AUTO: 0.31 X10(3) UL (ref 0–0.7)
EOSINOPHIL NFR BLD AUTO: 4.5 %
ERYTHROCYTE [DISTWIDTH] IN BLOOD BY AUTOMATED COUNT: 13.9 %
EST. AVERAGE GLUCOSE BLD GHB EST-MCNC: 160 MG/DL (ref 68–126)
GLOBULIN PLAS-MCNC: 2.2 G/DL (ref 2–3.5)
GLUCOSE BLD-MCNC: 102 MG/DL (ref 70–99)
GLUCOSE BLD-MCNC: 127 MG/DL (ref 70–99)
GLUCOSE BLD-MCNC: 131 MG/DL (ref 70–99)
GLUCOSE BLD-MCNC: 147 MG/DL (ref 70–99)
GLUCOSE BLD-MCNC: 156 MG/DL (ref 70–99)
GLUCOSE BLD-MCNC: 85 MG/DL (ref 70–99)
GLUCOSE BLD-MCNC: 89 MG/DL (ref 70–99)
HBA1C MFR BLD: 7.2 % (ref ?–5.7)
HCT VFR BLD AUTO: 42.7 % (ref 39–53)
HGB BLD-MCNC: 14.2 G/DL (ref 13–17.5)
IMM GRANULOCYTES # BLD AUTO: 0.08 X10(3) UL (ref 0–1)
IMM GRANULOCYTES NFR BLD: 1.2 %
LIPASE SERPL-CCNC: 63 U/L (ref 12–53)
LYMPHOCYTES # BLD AUTO: 1.23 X10(3) UL (ref 1–4)
LYMPHOCYTES NFR BLD AUTO: 18 %
MCH RBC QN AUTO: 27.7 PG (ref 26–34)
MCHC RBC AUTO-ENTMCNC: 33.3 G/DL (ref 31–37)
MCV RBC AUTO: 83.2 FL (ref 80–100)
MONOCYTES # BLD AUTO: 0.89 X10(3) UL (ref 0.1–1)
MONOCYTES NFR BLD AUTO: 13 %
NEUTROPHILS # BLD AUTO: 4.26 X10 (3) UL (ref 1.5–7.7)
NEUTROPHILS # BLD AUTO: 4.26 X10(3) UL (ref 1.5–7.7)
NEUTROPHILS NFR BLD AUTO: 62.6 %
OSMOLALITY SERPL CALC.SUM OF ELEC: 301 MOSM/KG (ref 275–295)
P AXIS: 49 DEGREES
P-R INTERVAL: 324 MS
PLATELET # BLD AUTO: 190 10(3)UL (ref 150–450)
POTASSIUM SERPL-SCNC: 4.5 MMOL/L (ref 3.5–5.1)
PROT SERPL-MCNC: 6.6 G/DL (ref 5.7–8.2)
Q-T INTERVAL: 478 MS
QRS DURATION: 178 MS
QTC CALCULATION (BEZET): 478 MS
R AXIS: -56 DEGREES
RBC # BLD AUTO: 5.13 X10(6)UL (ref 3.8–5.8)
SODIUM SERPL-SCNC: 138 MMOL/L (ref 136–145)
T AXIS: 117 DEGREES
TROPONIN I SERPL HS-MCNC: 7 NG/L (ref ?–53)
TROPONIN I SERPL HS-MCNC: 9 NG/L (ref ?–53)
TROPONIN I SERPL HS-MCNC: 9 NG/L (ref ?–53)
VENTRICULAR RATE: 60 BPM
WBC # BLD AUTO: 6.8 X10(3) UL (ref 4–11)

## 2025-06-26 PROCEDURE — 71045 X-RAY EXAM CHEST 1 VIEW: CPT | Performed by: EMERGENCY MEDICINE

## 2025-06-26 PROCEDURE — 99223 1ST HOSP IP/OBS HIGH 75: CPT | Performed by: STUDENT IN AN ORGANIZED HEALTH CARE EDUCATION/TRAINING PROGRAM

## 2025-06-26 PROCEDURE — 99223 1ST HOSP IP/OBS HIGH 75: CPT | Performed by: INTERNAL MEDICINE

## 2025-06-26 RX ORDER — ASPIRIN 81 MG/1
81 TABLET ORAL DAILY
Status: DISCONTINUED | OUTPATIENT
Start: 2025-06-26 | End: 2025-06-27

## 2025-06-26 RX ORDER — ENOXAPARIN SODIUM 100 MG/ML
30 INJECTION SUBCUTANEOUS DAILY
Status: DISCONTINUED | OUTPATIENT
Start: 2025-06-26 | End: 2025-06-27

## 2025-06-26 RX ORDER — VERAPAMIL HYDROCHLORIDE 2.5 MG/ML
INJECTION INTRAVENOUS
Status: COMPLETED
Start: 2025-06-26 | End: 2025-06-26

## 2025-06-26 RX ORDER — ECHINACEA PURPUREA EXTRACT 125 MG
1 TABLET ORAL
Status: DISCONTINUED | OUTPATIENT
Start: 2025-06-26 | End: 2025-06-27

## 2025-06-26 RX ORDER — METOCLOPRAMIDE HYDROCHLORIDE 5 MG/ML
5 INJECTION INTRAMUSCULAR; INTRAVENOUS EVERY 8 HOURS PRN
Status: DISCONTINUED | OUTPATIENT
Start: 2025-06-26 | End: 2025-06-27

## 2025-06-26 RX ORDER — DEXTROSE MONOHYDRATE 25 G/50ML
50 INJECTION, SOLUTION INTRAVENOUS
Status: DISCONTINUED | OUTPATIENT
Start: 2025-06-26 | End: 2025-06-27

## 2025-06-26 RX ORDER — POLYETHYLENE GLYCOL 3350 17 G/17G
17 POWDER, FOR SOLUTION ORAL DAILY PRN
Status: DISCONTINUED | OUTPATIENT
Start: 2025-06-26 | End: 2025-06-27

## 2025-06-26 RX ORDER — HEPARIN SODIUM 5000 [USP'U]/ML
INJECTION, SOLUTION INTRAVENOUS; SUBCUTANEOUS
Status: COMPLETED
Start: 2025-06-26 | End: 2025-06-26

## 2025-06-26 RX ORDER — ROSUVASTATIN CALCIUM 5 MG/1
5 TABLET, COATED ORAL
Status: DISCONTINUED | OUTPATIENT
Start: 2025-06-26 | End: 2025-06-27

## 2025-06-26 RX ORDER — MIDAZOLAM HYDROCHLORIDE 1 MG/ML
INJECTION INTRAMUSCULAR; INTRAVENOUS
Status: COMPLETED
Start: 2025-06-26 | End: 2025-06-26

## 2025-06-26 RX ORDER — RAMIPRIL 5 MG/1
10 CAPSULE ORAL 2 TIMES DAILY
Status: DISCONTINUED | OUTPATIENT
Start: 2025-06-26 | End: 2025-06-27

## 2025-06-26 RX ORDER — CARVEDILOL 12.5 MG/1
25 TABLET ORAL 2 TIMES DAILY WITH MEALS
Status: DISCONTINUED | OUTPATIENT
Start: 2025-06-26 | End: 2025-06-27

## 2025-06-26 RX ORDER — LIDOCAINE HYDROCHLORIDE 10 MG/ML
INJECTION, SOLUTION EPIDURAL; INFILTRATION; INTRACAUDAL; PERINEURAL
Status: COMPLETED
Start: 2025-06-26 | End: 2025-06-26

## 2025-06-26 RX ORDER — ACETAMINOPHEN 500 MG
500 TABLET ORAL EVERY 4 HOURS PRN
Status: DISCONTINUED | OUTPATIENT
Start: 2025-06-26 | End: 2025-06-27

## 2025-06-26 RX ORDER — ENOXAPARIN SODIUM 100 MG/ML
40 INJECTION SUBCUTANEOUS DAILY
Status: DISCONTINUED | OUTPATIENT
Start: 2025-06-26 | End: 2025-06-26

## 2025-06-26 RX ORDER — ASPIRIN 81 MG/1
324 TABLET, CHEWABLE ORAL ONCE
Status: COMPLETED | OUTPATIENT
Start: 2025-06-26 | End: 2025-06-26

## 2025-06-26 RX ORDER — AMLODIPINE BESYLATE 5 MG/1
5 TABLET ORAL 2 TIMES DAILY
Status: DISCONTINUED | OUTPATIENT
Start: 2025-06-26 | End: 2025-06-27

## 2025-06-26 RX ORDER — BISACODYL 10 MG
10 SUPPOSITORY, RECTAL RECTAL
Status: DISCONTINUED | OUTPATIENT
Start: 2025-06-26 | End: 2025-06-27

## 2025-06-26 RX ORDER — SODIUM BICARBONATE 650 MG/1
650 TABLET ORAL DAILY
Status: DISCONTINUED | OUTPATIENT
Start: 2025-06-26 | End: 2025-06-27

## 2025-06-26 RX ORDER — ONDANSETRON 2 MG/ML
4 INJECTION INTRAMUSCULAR; INTRAVENOUS EVERY 6 HOURS PRN
Status: DISCONTINUED | OUTPATIENT
Start: 2025-06-26 | End: 2025-06-27

## 2025-06-26 RX ORDER — SENNOSIDES 8.6 MG
17.2 TABLET ORAL NIGHTLY PRN
Status: DISCONTINUED | OUTPATIENT
Start: 2025-06-26 | End: 2025-06-27

## 2025-06-26 RX ORDER — SODIUM CHLORIDE 9 MG/ML
INJECTION, SOLUTION INTRAVENOUS
Status: DISCONTINUED | OUTPATIENT
Start: 2025-06-27 | End: 2025-06-26 | Stop reason: HOSPADM

## 2025-06-26 RX ORDER — TAMSULOSIN HYDROCHLORIDE 0.4 MG/1
0.4 CAPSULE ORAL DAILY
Status: DISCONTINUED | OUTPATIENT
Start: 2025-06-26 | End: 2025-06-26

## 2025-06-26 RX ORDER — NITROGLYCERIN 20 MG/100ML
INJECTION INTRAVENOUS
Status: COMPLETED
Start: 2025-06-26 | End: 2025-06-26

## 2025-06-26 RX ORDER — NITROGLYCERIN 0.4 MG/1
0.4 TABLET SUBLINGUAL ONCE
Status: COMPLETED | OUTPATIENT
Start: 2025-06-26 | End: 2025-06-26

## 2025-06-26 RX ORDER — NICOTINE POLACRILEX 4 MG
15 LOZENGE BUCCAL
Status: DISCONTINUED | OUTPATIENT
Start: 2025-06-26 | End: 2025-06-27

## 2025-06-26 RX ORDER — NITROGLYCERIN 0.4 MG/1
0.4 TABLET SUBLINGUAL EVERY 5 MIN PRN
Status: DISCONTINUED | OUTPATIENT
Start: 2025-06-26 | End: 2025-06-27

## 2025-06-26 RX ORDER — NICOTINE POLACRILEX 4 MG
30 LOZENGE BUCCAL
Status: DISCONTINUED | OUTPATIENT
Start: 2025-06-26 | End: 2025-06-27

## 2025-06-26 RX ORDER — BENZONATATE 200 MG/1
200 CAPSULE ORAL 3 TIMES DAILY PRN
Status: DISCONTINUED | OUTPATIENT
Start: 2025-06-26 | End: 2025-06-27

## 2025-06-26 RX ORDER — SODIUM CHLORIDE 9 MG/ML
INJECTION, SOLUTION INTRAVENOUS CONTINUOUS
Status: DISCONTINUED | OUTPATIENT
Start: 2025-06-26 | End: 2025-06-27

## 2025-06-26 RX ORDER — TICAGRELOR 60 MG/1
60 TABLET, FILM COATED ORAL 2 TIMES DAILY
Status: DISCONTINUED | OUTPATIENT
Start: 2025-06-26 | End: 2025-06-27

## 2025-06-26 NOTE — CONSULTS
[facility]  Report of Consultation    Kin Guerra Patient Status:  Observation    1950 MRN [id number]   Location [facility] 0SW-A Attending Debora Acharya, DO   Hosp Day # 0 PCP TRISH NAIK MD     Reason for Consultation:  CKD4    History of Present Illness:  Kin Guerra is a 75 year old male with history of CAD, DM2, HTN and HLD who presents with chest pain. Nephrology consulted for optimization given history of CKD4.     He presented this AM with chest pain that was sharp and intermittently associated with movement. Has been having pain intermittently for 2 weeks. Seen by cardiology, plan for cath.     Has history of CKD4 due to long-standing DM2/HTN, serum creatinine baseline ~3-3.2 mg/dL, currently at baseline.    History:  Past Medical History[1]  Past Surgical History[2]  Family History[3]  Denies family history of kidney disease.    reports that he has never smoked. He has never used smokeless tobacco. He reports that he does not drink alcohol and does not use drugs.    Allergies:  Allergies[4]    Medications:  Current Hospital Medications[5]  Prior to Admission Medications[6]    Review of Systems:  Please see HPI for pertinent positives. 10 point review of systems otherwise reviewed and negative.     Physical Exam:  BP (!) 171/74 (BP Location: Left arm)   Pulse 59   Temp 97.6 °F (36.4 °C) (Oral)   Resp 20   Ht 5' 7\" (1.702 m)   Wt 209 lb 11.2 oz (95.1 kg)   SpO2 97%   BMI 32.84 kg/m²   Temp (24hrs), Av °F (36.7 °C), Min:97.6 °F (36.4 °C), Max:98.8 °F (37.1 °C)     No intake or output data in the 24 hours ending 25 1422  Wt Readings from Last 3 Encounters:   25 209 lb 11.2 oz (95.1 kg)   25 208 lb 6 oz (94.5 kg)   24 204 lb 8 oz (92.8 kg)     General: awake, alert  HEENT: No scleral icterus, MMM  Neck: Supple, no JOSE D or thyromegaly  Cardiac: Regular rate and rhythm, S1, S2 normal  Lungs: CTA bilaterally.   Abdomen: Soft, non-tender.    Extremities: Without clubbing, cyanosis; no edema  Neurologic: Cranial nerves grossly intact, moving all extremities  Skin: Warm and dry, no rashes      Laboratory Data:    Recent Labs   Lab 25   WBC 6.8   HGB 14.2   MCV 83.2   .0       Recent Labs   Lab 25      K 4.5      CO2 19.0*   BUN 47*   CREATSERUM 3.16*   CA 9.4   *       Recent Labs   Lab 25   ALT 11   AST 17   ALB 4.4       Recent Labs   Lab 25  0817 25  1103   PGLU 131* 127*       Imaging:  All imaging studies reviewed.    Assessment / Plan:    1) Chronic kidney disease stage 4: Due to long-standing DM2 and HTN. Serum creatinine baseline ~3-3.2 mg/dL, currently at baseline. Discussed risk of contrast associated nephropathy but risk overall low. Will pre and post-hydrate with NS, agree with NS at 150mL/hr. Follow renal function daily.     2) Chest pain: c/f unstable angina, Cardiology following, plan for cath today.     3) HTN: controlled, on amlodipine, coreg and ramipril.     4) NAGMA: continue home sodium bicarb    Thank you for allowing me to participate in this patient's care. Please feel free to call me with any questions or concerns.     Ann Snell MD  25         [1]   Past Medical History:   Atherosclerosis of coronary artery    Cancer (HCC)    bladder    Chronic renal insufficiency    Diabetes (HCC)    Heart attack (HCC)    High blood pressure    High cholesterol    Obesity    Renal disorder    Stroke (HCC)    X2   [2]   Past Surgical History:  Procedure Laterality Date    Angioplasty (coronary)  2016    stent right coronary artery X3    Other surgical history  2002 2003    2x knee replacements   [3]   Family History  Problem Relation Age of Onset    Heart Disorder Father     Heart Disorder Mother     Heart Disorder Sister         3 sisters  from heart attack   [4]   Allergies  Allergen Reactions    Clopidogrel UNKNOWN and SWELLING     Other reaction(s):  Swelling (localized)  Other reaction(s): Other (See Comments)  Cerner Allergy Text Annotation: Plavix, Cerner Reaction: swelling    Radiology Contrast Iodinated Dyes RENAL INSUFFICIENCY    Sulfamethoxazole SWELLING and OTHER (SEE COMMENTS)     Cerner Allergy Text Annotation: sulfamethoxazole, Cerner Reaction: swelling    Sulfa Antibiotics UNKNOWN   [5]   Current Facility-Administered Medications:     glucose (Dex4) 15 GM/59ML oral liquid 15 g, 15 g, Oral, Q15 Min PRN **OR** glucose (Glutose) 40% oral gel 15 g, 15 g, Oral, Q15 Min PRN **OR** glucose-vitamin C (Dex-4) chewable tab 4 tablet, 4 tablet, Oral, Q15 Min PRN **OR** dextrose 50% injection 50 mL, 50 mL, Intravenous, Q15 Min PRN **OR** glucose (Dex4) 15 GM/59ML oral liquid 30 g, 30 g, Oral, Q15 Min PRN **OR** glucose (Glutose) 40% oral gel 30 g, 30 g, Oral, Q15 Min PRN **OR** glucose-vitamin C (Dex-4) chewable tab 8 tablet, 8 tablet, Oral, Q15 Min PRN    acetaminophen (Tylenol Extra Strength) tab 500 mg, 500 mg, Oral, Q4H PRN    melatonin tab 3 mg, 3 mg, Oral, Nightly PRN    polyethylene glycol (PEG 3350) (Miralax) 17 g oral packet 17 g, 17 g, Oral, Daily PRN    sennosides (Senokot) tab 17.2 mg, 17.2 mg, Oral, Nightly PRN    bisacodyl (Dulcolax) 10 MG rectal suppository 10 mg, 10 mg, Rectal, Daily PRN    ondansetron (Zofran) 4 MG/2ML injection 4 mg, 4 mg, Intravenous, Q6H PRN    metoclopramide (Reglan) 5 mg/mL injection 5 mg, 5 mg, Intravenous, Q8H PRN    insulin aspart (NovoLOG) 100 Units/mL FlexPen 1-68 Units, 1-68 Units, Subcutaneous, TID CC    insulin aspart (NovoLOG) 100 Units/mL FlexPen 1-10 Units, 1-10 Units, Subcutaneous, TID AC and HS    benzonatate (Tessalon) cap 200 mg, 200 mg, Oral, TID PRN    glycerin-hypromellose- (Artificial Tears) 0.2-0.2-1 % ophthalmic solution 1 drop, 1 drop, Both Eyes, QID PRN    sodium chloride (Saline Mist) 0.65 % nasal solution 1 spray, 1 spray, Each Nare, Q3H PRN    nitroglycerin (Nitrostat) SL tab 0.4 mg, 0.4 mg,  Sublingual, Q5 Min PRN    enoxaparin (Lovenox) 30 MG/0.3ML SUBQ injection 30 mg, 30 mg, Subcutaneous, Daily    amLODIPine (Norvasc) tab 5 mg, 5 mg, Oral, BID    aspirin DR tab 81 mg, 81 mg, Oral, Daily    carvedilol (Coreg) tab 25 mg, 25 mg, Oral, BID with meals    ramipril (Altace) cap 10 mg, 10 mg, Oral, BID    sodium bicarbonate tab 650 mg, 650 mg, Oral, Daily    rosuvastatin (Crestor) tab 5 mg, 5 mg, Oral, Once per day on Monday Tuesday Wednesday Thursday Friday    ticagrelor (Brilinta) tab 60 mg, 60 mg, Oral, BID    sodium chloride 0.9% infusion, , Intravenous, Continuous    [START ON 6/27/2025] sodium chloride 0.9% infusion, , Intravenous, On Call  [6]   No current outpatient medications on file.

## 2025-06-26 NOTE — PROGRESS NOTES
Pt seen- intermittent CP with minimal activity.  Plan for Cardiac eval- possible Cath ?    Julianne Pyle MD

## 2025-06-26 NOTE — H&P
[facility]     Kin Guerra Patient Status:  Observation    1950 MRN [id number]   Location [facility] 0SW-A Attending Debora Acharya, DO   Hosp Day # 0 PCP TRISH NAIK MD     Chief Complaint: chest pain    Subjective:    History of Present Illness:     Kin Guerra is a 75 year old male with PMHX CAD (s/p 5 stents)/ DM/ HTN/ HLD/ CVA who presented to the hospital for chest pain. His pain has been intermittent for 2 weeks and is a sharp pain in his substernal region. Over the past 24 hrs he developed lightheadedness without syncope. He noticed his diastolic BP was in the 100-110 range and that the chest pain was more constant. He took a nitroglycerin at home with improvement. He was identical to when he needed his prior stent so he decided to seek medical eval. He reports that his blood work is usually normal even with prior blockages.    History/Other:    Past Medical History:  Past Medical History[1]  Past Surgical History:   Past Surgical History[2]   Family History:   Family History[3]  Social History:    reports that he has never smoked. He has never used smokeless tobacco. He reports that he does not drink alcohol and does not use drugs.     Allergies: Allergies[4]    Medications:  Medications Ordered Prior to Encounter[5]    Review of Systems:   A comprehensive review of systems was completed.    Pertinent positives and negatives noted in the HPI.    Objective:   Physical Exam:    /63   Pulse 60   Temp 98.8 °F (37.1 °C) (Oral)   Resp 18   Ht 5' 7\" (1.702 m)   Wt 209 lb 7 oz (95 kg)   SpO2 95%   BMI 32.80 kg/m²   General: No acute distress, Alert  Respiratory: No rhonchi, no wheezes  Cardiovascular: S1, S2. Regular rate and rhythm  Abdomen: Soft, Non-tender, non-distended, positive bowel sounds  Neuro: No new focal deficits  Extremities: No edema      Results:    Labs:      Labs Last 24 Hours:    Recent Labs   Lab 25  0319   RBC 5.13    HGB 14.2   HCT 42.7   MCV 83.2   MCH 27.7   MCHC 33.3   RDW 13.9   NEPRELIM 4.26   WBC 6.8   .0       Recent Labs   Lab 06/26/25 0319   *   BUN 47*   CREATSERUM 3.16*   EGFRCR 20*   CA 9.4   ALB 4.4      K 4.5      CO2 19.0*   ALKPHO 99   AST 17   ALT 11   BILT 0.2   TP 6.6       Estimated Glomerular Filtration Rate: 20 mL/min/1.73m2 (A) (result from lab).    Lab Results   Component Value Date    INR 0.91 11/30/2022       Recent Labs   Lab 06/26/25 0319   TROPHS 7       No results for input(s): \"TROP\", \"PBNP\" in the last 168 hours.    No results for input(s): \"PCT\" in the last 168 hours.    Imaging: Imaging data reviewed in Epic.    Assessment & Plan:      #Unstable angina  -troponin 7  -EKG showed a-paced  rhythm @60 bpm  -hx cath Dec 2022 with stent to distal RCA and on site of prior RCA stent  -r/o ACS  -cont to trend troponin until peak  -monitor on telemetry  -nitroglycerin prn  -chest xray pending  -cont home ASA, carvedilol, brillinta, statin  -cardiology c/s    #NAGMA  -bicarb 19 with AG 9  -cont to monitor BMP    #DM  -SSI, QID checks, hypoglycemia protocol  -hold home glipizide, dapgliflozin    #HTN  -cont home amlodipine, ramipril    #BPH  -cont home tamsulosin      Plan of care discussed with ED physician    Debora Acharya DO    Supplementary Documentation:     The 21st Century Cures Act makes medical notes like these available to patients in the interest of transparency. Please be advised this is a medical document. Medical documents are intended to carry relevant information, facts as evident, and the clinical opinion of the practitioner. The medical note is intended as peer to peer communication and may appear blunt or direct. It is written in medical language and may contain abbreviations or verbiage that are unfamiliar.                                       [1]   Past Medical History:   Atherosclerosis of coronary artery    Cancer (HCC)    bladder    Chronic renal  insufficiency    Diabetes (HCC)    Heart attack (HCC)    High blood pressure    High cholesterol    Obesity    Stroke (HCC)    X2   [2]   Past Surgical History:  Procedure Laterality Date    Angioplasty (coronary)  2016    stent right coronary artery X3    Other surgical history  2002 2003    2x knee replacements   [3] History reviewed. No pertinent family history.  [4]   Allergies  Allergen Reactions    Clopidogrel UNKNOWN and SWELLING     Other reaction(s): Swelling (localized)  Other reaction(s): Other (See Comments)  Cerner Allergy Text Annotation: Plavix, Cerner Reaction: swelling    Radiology Contrast Iodinated Dyes RENAL INSUFFICIENCY    Sulfamethoxazole SWELLING and OTHER (SEE COMMENTS)     Cerner Allergy Text Annotation: sulfamethoxazole, Cerner Reaction: swelling    Sulfa Antibiotics UNKNOWN   [5]   No current facility-administered medications on file prior to encounter.     Current Outpatient Medications on File Prior to Encounter   Medication Sig Dispense Refill    ticagrelor 60 MG Oral Tab Take 1 tablet (60 mg total) by mouth 2 (two) times daily.      tamsulosin 0.4 MG Oral Cap Take 1 capsule (0.4 mg total) by mouth daily.      sodium bicarbonate 650 MG Oral Tab Take 1 tablet (650 mg total) by mouth daily.      dapagliflozin 5 MG Oral Tab Take 1 tablet (5 mg total) by mouth daily.      carvedilol 25 MG Oral Tab Take 1 tablet (25 mg total) by mouth 2 (two) times daily with meals.      aspirin 81 MG Oral Tab EC Take 1 tablet (81 mg total) by mouth daily.      rosuvastatin 5 MG Oral Tab Take 1 tablet (5 mg total) by mouth. Take 5 days per week.      nitroGLYCERIN 0.4 MG Sublingual SL Tab Place 1 tablet (0.4 mg total) under the tongue as needed.      ramipril 10 MG Oral Cap Take 1 capsule (10 mg total) by mouth 2 (two) times daily.      amLODIPine 5 MG Oral Tab Take 1 tablet (5 mg total) by mouth in the morning and 1 tablet (5 mg total) before bedtime.      glipiZIDE ER 5 MG Oral Tablet 24 Hr Take 1 tablet  (5 mg total) by mouth daily with breakfast.

## 2025-06-26 NOTE — ED QUICK NOTES
Pt  wants to drive to [facility] via car for admission . Ok with dr monzon . Signed consent for car transport .  Out of Ed  with steady gait . Stable condition . Pain free . Iv heplock secured with coban dressing .

## 2025-06-26 NOTE — ED PROVIDER NOTES
Patient Seen in: [facility] [place]        History  Chief Complaint   Patient presents with    Chest Pain Angina     Stated Complaint: chest pain    Subjective:   HPI            For the past 3 weeks this pleasant 75-year-old gentleman has had chest pain middle of his chest sharp in nature occasionally worse with movement but not every movement.  Patient has not been sick recently cough cold runny nose or any complaints nitroglycerin has helped with the pain tonight.  Patient denies any other complaints      Objective:     Past Medical History:    Atherosclerosis of coronary artery    Cancer (HCC)    bladder    Chronic renal insufficiency    Diabetes (HCC)    Heart attack (HCC)    High blood pressure    High cholesterol    Obesity    Stroke (HCC)    X2              Past Surgical History:   Procedure Laterality Date    Angioplasty (coronary)  2016    stent right coronary artery X3    Other surgical history  2002 2003    2x knee replacements                Social History     Socioeconomic History    Marital status:    Tobacco Use    Smoking status: Never    Smokeless tobacco: Never   Vaping Use    Vaping status: Never Used   Substance and Sexual Activity    Alcohol use: No    Drug use: No     Social Drivers of Health     Food Insecurity: Low Risk  (2/10/2025)    Received from [facility]    Food Insecurity     Have there been times that your food ran out, and you didn't have money to get more?: No     Are there times that you worry that this might happen?: No   Transportation Needs: Low Risk  (2/10/2025)    Received from [facility]    Transportation Needs     Do you have trouble getting transportation to medical appointments?: No   Housing Stability: Low Risk  (2/10/2025)    Received from [facility]    Housing Stability     Are you worried that your electric, gas, oil, or water might be shut off?: No     Are you concerned  about having a safe and reliable place to live?: No                                Physical Exam    ED Triage Vitals [06/26/25 0319]   BP (!) 178/71   Pulse 90   Resp 16   Temp 98.8 °F (37.1 °C)   Temp src Oral   SpO2 96 %   O2 Device None (Room air)       Current Vitals:   Vital Signs  BP: 158/67  Pulse: 70  Resp: 20  Temp: 98.8 °F (37.1 °C)  Temp src: Oral    Oxygen Therapy  SpO2: 95 %  O2 Device: None (Room air)            Physical Exam     Awake alert patient appears in no distress HEENT exam cardiovascular exam shows regular rhythm abdomen soft nontender extremities no Cyanosis or edema no rash back exam is normal skin is nondiaphoretic      ED Course  Labs Reviewed   COMP METABOLIC PANEL (14) - Abnormal; Notable for the following components:       Result Value    Glucose 147 (*)     CO2 19.0 (*)     BUN 47 (*)     Creatinine 3.16 (*)     Calculated Osmolality 301 (*)     eGFR-Cr 20 (*)     All other components within normal limits   LIPASE - Abnormal; Notable for the following components:    Lipase 63 (*)     All other components within normal limits   TROPONIN I HIGH SENSITIVITY - Normal   CBC WITH DIFFERENTIAL WITH PLATELET   RAINBOW DRAW LAVENDER   RAINBOW DRAW LIGHT GREEN   RAINBOW DRAW BLUE     EKG    Rate, intervals and axes as noted on EKG Report.  Rate: 60  Rhythm: paced  Reading: No areas of acute ST segment elevation or depression           ED Course as of 06/26/25 0448  ------------------------------------------------------------  Time: 06/26 0413  Value: XR CHEST AP PORTABLE  (CPT=71045)  Comment: Independent interpretation by ED physician chest x-ray shows no acute disease  ------------------------------------------------------------  Time: 06/26 0440  Value: Comp Metabolic Panel (14)(!)  Comment: (Reviewed)     Differential diagnosis includes acute coronary syndrome, pneumothorax                  MDM         Admission disposition: 6/26/2025  4:48 AM           Medical Decision Making  75-year-old  male presenting to the emerged part for chest pain IV established cardiac monitor shows a paced rhythm pulse ox with no signs of hypoxia troponin levels normal lipase level slightly elevated otherwise laboratory tests are within symptoms.  Independent interpretation by ED physician chest x-ray shows no acute disease.  Patient will be admitted at this time for further evaluation    Problems Addressed:  Chest pain, rule out acute myocardial infarction: acute illness or injury    Amount and/or Complexity of Data Reviewed  Labs: ordered. Decision-making details documented in ED Course.  Radiology: ordered and independent interpretation performed. Decision-making details documented in ED Course.  ECG/medicine tests: ordered and independent interpretation performed. Decision-making details documented in ED Course.        Disposition and Plan     Clinical Impression:  1. Chest pain, rule out acute myocardial infarction         Disposition:  Admit  6/26/2025  4:48 am    Follow-up:  No follow-up provider specified.        Medications Prescribed:  Current Discharge Medication List                Supplementary Documentation:         Hospital Problems       Present on Admission  Date Reviewed: 6/26/2025          ICD-10-CM Noted POA    * (Principal) Chest pain, rule out acute myocardial infarction R07.9 6/26/2025 Unknown

## 2025-06-26 NOTE — PLAN OF CARE
Assumed care of patient at 0700  Consult to Cards  Mild chest pain, patient declines medication  BP slightly elevated, cards PA notified  Plan for cath, patient has been NPO  Consent signed and patient prepped  Consult to Nephrology for clearance d/t contrast allergy  IVF  Still awaiting Cath Lab to take patient to procedure  Patient currently resting in bed, call light within reach    Addendum: Patient left for cath lab at 1830. Report given to Adelaida PATRICK on CTU8. Patient left floor with all his belongings.      Problem: Diabetes/Glucose Control  Goal: Glucose maintained within prescribed range  Description: INTERVENTIONS:  - Monitor Blood Glucose as ordered  - Assess for signs and symptoms of hyperglycemia and hypoglycemia  - Administer ordered medications to maintain glucose within target range  - Assess barriers to adequate nutritional intake and initiate nutrition consult as needed  - Instruct patient on self management of diabetes  Outcome: Progressing

## 2025-06-26 NOTE — ED QUICK NOTES
Orders for admission, patient is aware of plan and ready to go upstairs. Any questions, please call ED RN osmar  at extension [phone].     Patient Covid vaccination status: Fully vaccinated     COVID Test Ordered in ED: None    COVID Suspicion at Admission: N/A    Running Infusions: Medication Infusions[1] None    Mental Status/LOC at time of transport: A x O 4    Other pertinent information:   CIWA score: N/A   NIH score:  N/A             [1]

## 2025-06-27 VITALS
BODY MASS INDEX: 31.94 KG/M2 | OXYGEN SATURATION: 99 % | RESPIRATION RATE: 20 BRPM | SYSTOLIC BLOOD PRESSURE: 172 MMHG | DIASTOLIC BLOOD PRESSURE: 71 MMHG | HEART RATE: 60 BPM | WEIGHT: 203.5 LBS | TEMPERATURE: 98 F | HEIGHT: 67 IN

## 2025-06-27 LAB
ANION GAP SERPL CALC-SCNC: 11 MMOL/L (ref 0–18)
BUN BLD-MCNC: 43 MG/DL (ref 9–23)
CALCIUM BLD-MCNC: 9 MG/DL (ref 8.7–10.6)
CHLORIDE SERPL-SCNC: 112 MMOL/L (ref 98–112)
CHOLEST SERPL-MCNC: 184 MG/DL (ref ?–200)
CO2 SERPL-SCNC: 21 MMOL/L (ref 21–32)
CREAT BLD-MCNC: 2.77 MG/DL (ref 0.7–1.3)
EGFRCR SERPLBLD CKD-EPI 2021: 23 ML/MIN/1.73M2 (ref 60–?)
ERYTHROCYTE [DISTWIDTH] IN BLOOD BY AUTOMATED COUNT: 13.8 %
GLUCOSE BLD-MCNC: 114 MG/DL (ref 70–99)
GLUCOSE BLD-MCNC: 145 MG/DL (ref 70–99)
GLUCOSE BLD-MCNC: 99 MG/DL (ref 70–99)
HCT VFR BLD AUTO: 40.3 % (ref 39–53)
HDLC SERPL-MCNC: 24 MG/DL (ref 40–59)
HGB BLD-MCNC: 13.1 G/DL (ref 13–17.5)
LDLC SERPL CALC-MCNC: 73 MG/DL (ref ?–100)
MCH RBC QN AUTO: 26.7 PG (ref 26–34)
MCHC RBC AUTO-ENTMCNC: 32.5 G/DL (ref 31–37)
MCV RBC AUTO: 82.2 FL (ref 80–100)
NONHDLC SERPL-MCNC: 160 MG/DL (ref ?–130)
OSMOLALITY SERPL CALC.SUM OF ELEC: 310 MOSM/KG (ref 275–295)
PLATELET # BLD AUTO: 186 10(3)UL (ref 150–450)
POTASSIUM SERPL-SCNC: 4.2 MMOL/L (ref 3.5–5.1)
RBC # BLD AUTO: 4.9 X10(6)UL (ref 3.8–5.8)
SODIUM SERPL-SCNC: 144 MMOL/L (ref 136–145)
TRIGL SERPL-MCNC: 555 MG/DL (ref 30–149)
VLDLC SERPL CALC-MCNC: 87 MG/DL (ref 0–30)
WBC # BLD AUTO: 5.8 X10(3) UL (ref 4–11)

## 2025-06-27 PROCEDURE — 99232 SBSQ HOSP IP/OBS MODERATE 35: CPT | Performed by: INTERNAL MEDICINE

## 2025-06-27 PROCEDURE — 99238 HOSP IP/OBS DSCHRG MGMT 30/<: CPT | Performed by: STUDENT IN AN ORGANIZED HEALTH CARE EDUCATION/TRAINING PROGRAM

## 2025-06-27 RX ORDER — HYDRALAZINE HYDROCHLORIDE 10 MG/1
10 TABLET, FILM COATED ORAL EVERY 8 HOURS SCHEDULED
Qty: 90 TABLET | Refills: 0 | Status: SHIPPED | OUTPATIENT
Start: 2025-06-27

## 2025-06-27 RX ORDER — HYDRALAZINE HYDROCHLORIDE 10 MG/1
10 TABLET, FILM COATED ORAL EVERY 8 HOURS SCHEDULED
Status: DISCONTINUED | OUTPATIENT
Start: 2025-06-27 | End: 2025-06-27

## 2025-06-27 NOTE — PROCEDURES
Elective cath via RRA for CP / CAD  Widely patent stents in RCA  No de margaret lesions  TR band placed

## 2025-06-27 NOTE — PROCEDURES
[facility]    PATIENT'S NAME: OMARI AVILA   ATTENDING PHYSICIAN: Debora Acharya DO   OPERATING PHYSICIAN: Clay Alvarez MD   PATIENT ACCOUNT#:   [id number]    LOCATION:  [address] [place] [address]  MEDICAL RECORD #:   [id number]       YOB: 1950  ADMISSION DATE:       06/26/2025      OPERATION DATE:  06/26/2025    CARDIAC PROCEDURE TRANSCRIPTION      CARDIAC CATHETERIZATION    PREOPERATIVE DIAGNOSIS:    1.   Chest pain.  2.   Coronary artery disease.  POSTOPERATIVE DIAGNOSIS:    1.   Chest pain.  2.   Coronary artery disease.  PROCEDURE PERFORMED:  Coronary angiography.    PROCEDURAL DETAILS:  After obtaining informed consent, we obtained access in the right radial artery.  JR4 catheter was advanced over a wire and used to engage the right coronary.  The right coronary was dominant.  There were stents in the proximal, mid, and distal portion which were widely patent.  JR4 catheter was removed over a wire and replaced with a JL3.5.  JL3.5 catheter was used to engage the left main.  Angiography showed the LAD to have mild luminal irregularities.  The circumflex had luminal irregularities.  JL3.5 catheter was removed over a wire.  TR band was placed.  The patient was transferred to Recovery in stable condition.      COMPLICATIONS:  None.    ESTIMATED BLOOD LOSS:  1 mL.    CONCLUSIONS:    1.   Widely patent stents in the dominant right coronary artery.  2.   Minor nonobstructive disease in the circumflex and LAD.    Dictated By Clay Alvarez MD  d: 06/26/2025 19:23:29  t: 06/27/2025 08:12:26  Job [id number]  TD/   <-- Click to add NO pertinent Family History

## 2025-06-27 NOTE — PROGRESS NOTES
[facility]  Progress Note    Kin Guerra Patient Status:  Observation    1950 MRN [id number]   Location [facility] 8NE-A Attending Debora Acharya, DO   Hosp Day # 0 PCP TRISH NAIK MD       Assessment:    Atypical chest pain - no change in coronary anatomy  CKD  DM  HTN    Plan:     BMP pending  If renal function stable, ok for discharge  Cont home meds  Follow up with Dr. Carson as previously scheduled    Subjective:  No chest pain or shortness of breath.    Objective:  /72 (BP Location: Left arm)   Pulse 60   Temp 98.2 °F (36.8 °C) (Oral)   Resp 14   Ht 5' 7\" (1.702 m)   Wt 203 lb 7.8 oz (92.3 kg)   SpO2 97%   BMI 31.87 kg/m²     Temp (24hrs), Av.9 °F (36.6 °C), Min:97.6 °F (36.4 °C), Max:98.2 °F (36.8 °C)      Tele  SR    Intake/Output:    Intake/Output Summary (Last 24 hours) at 2025 0534  Last data filed at 2025 0400  Gross per 24 hour   Intake 240 ml   Output 1200 ml   Net -960 ml       Last 3 Weights   25 1945 203 lb 7.8 oz (92.3 kg)   25 0649 209 lb 11.2 oz (95.1 kg)   25 0319 209 lb 7 oz (95 kg)   25 1322 208 lb 6 oz (94.5 kg)   24 1028 204 lb 8 oz (92.8 kg)               Physical Exam:    Gen: alert, oriented x 3, NAD  Heent/neck: no jvd  Cardiac: regular rate and rhythm, normal S1,S2  Lungs: CTA  Abd: soft, NT/ND +bs  Ext: no edema; RRA access site soft without hematoma, neurovascularly intact      Labs:  Lab Results   Component Value Date    WBC 5.8 2025    HGB 13.1 2025    HCT 40.3 2025    .0 2025       Medications:    Scheduled Medications[1]  Medication Infusions[2]      Clay Alvarez MD  2025  5:34 AM         [1]    insulin aspart  1-68 Units Subcutaneous TID CC    insulin aspart  1-10 Units Subcutaneous TID AC and HS    enoxaparin  30 mg Subcutaneous Daily    amLODIPine  5 mg Oral BID    aspirin  81 mg Oral Daily    carvedilol  25 mg Oral BID with meals    ramipril  10 mg  Oral BID    sodium bicarbonate  650 mg Oral Daily    rosuvastatin  5 mg Oral Once per day on Monday Tuesday Wednesday Thursday Friday    ticagrelor  60 mg Oral BID   [2]    sodium chloride 150 mL/hr at 06/27/25 0502

## 2025-06-27 NOTE — DISCHARGE SUMMARY
[place] HOSPITALIST  DISCHARGE SUMMARY     Kin Guerra Patient Status:  Observation    1950 MRN [id number]   Location [facility] 8NE-A Attending Debora Acharya, DO   Hosp Day # 0 PCP TRISH NAIK MD     Date of Admission: 2025  Date of Discharge:   25    Discharge Disposition: Home or Self Care    Discharge Diagnosis:  #Unstable angina  -troponin 7  -EKG showed a-paced  rhythm @60 bpm  -hx cath Dec 2022 with stent to distal RCA and on site of prior RCA stent  -r/o ACS  -cont to trend troponin until peak  -monitor on telemetry  -nitroglycerin prn  -chest xray pending  -cont home ASA, carvedilol, brillinta, statin  -cardiology c/s     #NAGMA  -bicarb 19 with AG 9  -cont to monitor BMP     #DM  -SSI, QID checks, hypoglycemia protocol  -hold home glipizide, dapgliflozin     #HTN  -cont home amlodipine, ramipril     #BPH  -cont home tamsulosin    History of Present Illness:   Kin Guerra is a 75 year old male with PMHX CAD (s/p 5 stents)/ DM/ HTN/ HLD/ CVA who presented to the hospital for chest pain. His pain has been intermittent for 2 weeks and is a sharp pain in his substernal region. Over the past 24 hrs he developed lightheadedness without syncope. He noticed his diastolic BP was in the 100-110 range and that the chest pain was more constant. He took a nitroglycerin at home with improvement. He was identical to when he needed his prior stent so he decided to seek medical eval. He reports that his blood work is usually normal even with prior blockages.     Brief Synopsis: Pt admitted, s/p cardiac cath with no new obstructive dx. DC home in stable condition.     Lace+ Score: 52  59-90 High Risk  29-58 Medium Risk  0-28   Low Risk       TCM Follow-Up Recommendation:  LACE > 58: High Risk of readmission after discharge from the hospital.      Procedures during hospitalization:   Cardiac cath 25    CONCLUSIONS:    1.     Widely patent stents in the dominant right coronary  artery.  2.     Minor nonobstructive disease in the circumflex and LAD.    Incidental or significant findings and recommendations (brief descriptions):  no    Lab/Test results pending at Discharge:   no    Consultants:  Cardiology , REnal     Discharge Medication List:     Discharge Medications        CONTINUE taking these medications        Instructions Prescription details   amLODIPine 5 MG Tabs  Commonly known as: Norvasc      Take 1 tablet (5 mg total) by mouth in the morning and 1 tablet (5 mg total) before bedtime.   Refills: 0     aspirin 81 MG Tbec      Take 1 tablet (81 mg total) by mouth in the morning.   Refills: 0     carvedilol 25 MG Tabs  Commonly known as: Coreg      Take 1 tablet (25 mg total) by mouth in the morning and 1 tablet (25 mg total) in the evening. Take with meals.   Refills: 0     nitroglycerin 0.4 MG Subl  Commonly known as: Nitrostat      Place 1 tablet (0.4 mg total) under the tongue as needed.   Refills: 0     ramipril 10 MG Caps  Commonly known as: Altace      Take 1 capsule (10 mg total) by mouth in the morning and 1 capsule (10 mg total) before bedtime.   Refills: 0     rosuvastatin 5 MG Tabs  Commonly known as: Crestor      Take 1 tablet (5 mg total) by mouth. Take 5 days per week.does not take on Saturday and Sunday   Refills: 0     ticagrelor 60 MG Tabs  Commonly known as: Brilinta      Take 1 tablet (60 mg total) by mouth in the morning and 1 tablet (60 mg total) before bedtime.   Refills: 0            ASK your doctor about these medications        Instructions Prescription details   dapagliflozin 5 MG Tabs  Commonly known as: Farxiga      Take 1 tablet (5 mg total) by mouth in the morning.   Refills: 0     glipiZIDE ER 5 MG Tb24  Commonly known as: Glucotrol XL      Take 1 tablet (5 mg total) by mouth in the morning and 1 tablet (5 mg total) before bedtime.   Refills: 0     sodium bicarbonate 650 MG Tabs      Take 1 tablet (650 mg total) by mouth in the morning.   Refills: 0      tamsulosin 0.4 MG Caps  Commonly known as: Flomax      Take 1 capsule (0.4 mg total) by mouth in the morning. NOT TAKING.   Refills: 0              [place] reviewed[place] n[place]    Follow-up appointment:   Awais Carson MD  [address]  [facility] [id number] [phone]    Follow up in 1 week(s)  Office will call you to schedule the appointment    Kailyn Joy MD  [address]  [place] [id number] [phone]    Schedule an appointment as soon as possible for a visit in 1 month(s)      Appointments for Next 30 Days 6/27/2025 - 7/27/2025      None            Vital signs:  Temp:  [97.5 °F (36.4 °C)-98.2 °F (36.8 °C)] 97.9 °F (36.6 °C)  Pulse:  [60-74] 74  Resp:  [14-23] 16  BP: (141-177)/(62-77) 150/69  SpO2:  [95 %-100 %] 99 %    Physical Exam:    General: No acute distress   Lungs: clear to auscultation  Cardiovascular: S1, S2  Abdomen: Soft      -----------------------------------------------------------------------------------------------  PATIENT DISCHARGE INSTRUCTIONS: See electronic chart    Julianne Pyle MD    Total time spent on discharge planning:  <30 minutes     The 21st Century Cures Act makes medical notes like these available to patients in the interest of transparency. Please be advised this is a medical document. Medical documents are intended to carry relevant information, facts as evident, and the clinical opinion of the practitioner. The medical note is intended as peer to peer communication and may appear blunt or direct. It is written in medical language and may contain abbreviations or verbiage that are unfamiliar.

## 2025-06-27 NOTE — CONSULTS
[facility]  Cardiology Consultation    Kin Guerra Patient Status:  Observation    1950 MRN [id number]   Location [facility] 8NE-A Attending Debora Acharya, DO   Hosp Day # 0 PCP TRISH NAIK MD     Reason for Consultation:  Chest pain    History of Present Illness:  Kin Guerra is a a(n) 75 year old male who presents with chest pain.    Patient has prior history of coronary artery disease with multiple prior PCI.  States he has had prior nuclear stress testing which has been unremarkable.  Nuclear stress testing has been unrevealing and patient.    He has symptoms which was similar to his prior MIs/stents.  Has had chest pain which has recently gotten worse over the past 3 weeks.  Yesterday he had chest pain overnight which was intermittent.  Also had lightheaded dizziness    Patient is concerned about underlying CAD    History:  Past Medical History[1]  Past Surgical History[2]  Family History[3]   reports that he has never smoked. He has never used smokeless tobacco. He reports that he does not drink alcohol and does not use drugs.    Allergies:  Allergies[4]    Medications:  Current Hospital Medications[5]    Review of Systems:  A comprehensive review of systems was negative if not otherwise mention in above HPI.    /62 (BP Location: Left arm)   Pulse 60   Temp 98.1 °F (36.7 °C) (Oral)   Resp 20   Ht 5' 7\" (1.702 m)   Wt 209 lb 11.2 oz (95.1 kg)   SpO2 99%   BMI 32.84 kg/m²   Temp (24hrs), Av °F (36.7 °C), Min:97.6 °F (36.4 °C), Max:98.8 °F (37.1 °C)     No intake or output data in the 24 hours ending 25  Wt Readings from Last 3 Encounters:   25 209 lb 11.2 oz (95.1 kg)   25 208 lb 6 oz (94.5 kg)   24 204 lb 8 oz (92.8 kg)       Physical Exam:   General: Alert and oriented x 3. No apparent distress. No respiratory or constitutional distress.  HEENT: Normocephalic  Neck: No JVD  Cardiac: Regular rate and rhythm. S1, S2 normal.  No murmur  Lungs: Clear without wheezes, rales, rhonchi or dullness.   Abdomen: Soft, non-tender.   Extremities: Without clubbing, cyanosis or edema.   Neurologic: Alert and oriented, normal affect.  Skin: Warm and dry.     Laboratory Data:  Lab Results   Component Value Date    WBC 6.8 06/26/2025    HGB 14.2 06/26/2025    HCT 42.7 06/26/2025    .0 06/26/2025    CREATSERUM 3.16 06/26/2025    BUN 47 06/26/2025     06/26/2025    K 4.5 06/26/2025     06/26/2025    CO2 19.0 06/26/2025     06/26/2025    CA 9.4 06/26/2025    ALB 4.4 06/26/2025    ALKPHO 99 06/26/2025    BILT 0.2 06/26/2025    TP 6.6 06/26/2025    AST 17 06/26/2025    ALT 11 06/26/2025    LIP 63 06/26/2025    PGLU 89 06/26/2025     Recent Labs   Lab 06/26/25  0319 06/26/25  0750 06/26/25  1203   TROPHS 7 9 9         Imaging:  Reviewed  Impression:    Chest pain  CAD s/p prior PCI  Advanced CKD    Recommendations:  Patient chest pain is concerning for underlying CAD.  Discussed in detail regarding further management.  Discussed medical management versus noninvasive assessment versus cardiac catheterization.  Patient and wife are  In agreement of having definitive assessment with cardiac catheterization.  In particular, discussed risk of nephropathy including contrast nephropathy.  He is agreeable to proceed.  Indication: Risk, benefits alternatives of catheterization were reviewed in detail with patient.  Will give IV fluid hydration prior to procedure.    Further recommendation following cardiac catheterization.        Thank you for allowing me to participate in the care of your patient.    Lindsey Fernandes MD  6/26/2025  8:50 PM       [1]   Past Medical History:   Atherosclerosis of coronary artery    Cancer (HCC)    bladder    Chronic renal insufficiency    Diabetes (HCC)    Heart attack (HCC)    High blood pressure    High cholesterol    Obesity    Renal disorder    Stroke (HCC)    X2   [2]   Past Surgical History:  Procedure  Laterality Date    Angioplasty (coronary)  2016    stent right coronary artery X3    Other surgical history  2002 2003    2x knee replacements   [3]   Family History  Problem Relation Age of Onset    Heart Disorder Father     Heart Disorder Mother     Heart Disorder Sister         3 sisters  from heart attack   [4]   Allergies  Allergen Reactions    Clopidogrel UNKNOWN and SWELLING     Other reaction(s): Swelling (localized)  Other reaction(s): Other (See Comments)  Cerner Allergy Text Annotation: Plavix, Cerner Reaction: swelling    Radiology Contrast Iodinated Dyes RENAL INSUFFICIENCY    Sulfamethoxazole SWELLING and OTHER (SEE COMMENTS)     Cerner Allergy Text Annotation: sulfamethoxazole, Cerner Reaction: swelling    Sulfa Antibiotics UNKNOWN   [5]   Current Facility-Administered Medications:     glucose (Dex4) 15 GM/59ML oral liquid 15 g, 15 g, Oral, Q15 Min PRN **OR** glucose (Glutose) 40% oral gel 15 g, 15 g, Oral, Q15 Min PRN **OR** glucose-vitamin C (Dex-4) chewable tab 4 tablet, 4 tablet, Oral, Q15 Min PRN **OR** dextrose 50% injection 50 mL, 50 mL, Intravenous, Q15 Min PRN **OR** glucose (Dex4) 15 GM/59ML oral liquid 30 g, 30 g, Oral, Q15 Min PRN **OR** glucose (Glutose) 40% oral gel 30 g, 30 g, Oral, Q15 Min PRN **OR** glucose-vitamin C (Dex-4) chewable tab 8 tablet, 8 tablet, Oral, Q15 Min PRN    acetaminophen (Tylenol Extra Strength) tab 500 mg, 500 mg, Oral, Q4H PRN    melatonin tab 3 mg, 3 mg, Oral, Nightly PRN    polyethylene glycol (PEG 3350) (Miralax) 17 g oral packet 17 g, 17 g, Oral, Daily PRN    sennosides (Senokot) tab 17.2 mg, 17.2 mg, Oral, Nightly PRN    bisacodyl (Dulcolax) 10 MG rectal suppository 10 mg, 10 mg, Rectal, Daily PRN    ondansetron (Zofran) 4 MG/2ML injection 4 mg, 4 mg, Intravenous, Q6H PRN    metoclopramide (Reglan) 5 mg/mL injection 5 mg, 5 mg, Intravenous, Q8H PRN    insulin aspart (NovoLOG) 100 Units/mL FlexPen 1-68 Units, 1-68 Units, Subcutaneous, TID CC    insulin  aspart (NovoLOG) 100 Units/mL FlexPen 1-10 Units, 1-10 Units, Subcutaneous, TID AC and HS    benzonatate (Tessalon) cap 200 mg, 200 mg, Oral, TID PRN    glycerin-hypromellose- (Artificial Tears) 0.2-0.2-1 % ophthalmic solution 1 drop, 1 drop, Both Eyes, QID PRN    sodium chloride (Saline Mist) 0.65 % nasal solution 1 spray, 1 spray, Each Nare, Q3H PRN    nitroglycerin (Nitrostat) SL tab 0.4 mg, 0.4 mg, Sublingual, Q5 Min PRN    enoxaparin (Lovenox) 30 MG/0.3ML SUBQ injection 30 mg, 30 mg, Subcutaneous, Daily    amLODIPine (Norvasc) tab 5 mg, 5 mg, Oral, BID    aspirin DR tab 81 mg, 81 mg, Oral, Daily    carvedilol (Coreg) tab 25 mg, 25 mg, Oral, BID with meals    ramipril (Altace) cap 10 mg, 10 mg, Oral, BID    sodium bicarbonate tab 650 mg, 650 mg, Oral, Daily    rosuvastatin (Crestor) tab 5 mg, 5 mg, Oral, Once per day on Monday Tuesday Wednesday Thursday Friday    ticagrelor (Brilinta) tab 60 mg, 60 mg, Oral, BID    sodium chloride 0.9% infusion, , Intravenous, Continuous

## 2025-06-27 NOTE — PROGRESS NOTES
[facility]  Nephrology Progress Note    Kin Guerra Patient Status:  Observation    1950 MRN [id number]   [facility] 8NE-A Attending Debora Acharya, DO   Hosp Day # 0 PCP TRISH NAIK MD     Subjective:  Feeling well, no chest pain or shortness of breath this AM. Cardiac cath yesterday with patent stents and minor nonobstructive disease in circumflex and LAD.    Objective:  Vital signs: Blood pressure (!) 177/66, pulse 65, temperature 97.5 °F (36.4 °C), temperature source Oral, resp. rate 18, height 5' 7\" (1.702 m), weight 203 lb 7.8 oz (92.3 kg), SpO2 98%.  General: No acute distress. Alert and oriented x 3.  HEENT: Moist mucous membranes. EOM-I.   Respiratory: Clear to auscultation bilaterally.   Cardiovascular: S1, S2.  Regular rate and rhythm.   Abdomen: Soft, nontender, nondistended.   Neurologic: No focal neurological deficits.  Musculoskeletal:   No swelling noted.    Current Hospital Medications[1]      Recent Labs   Lab 25  0319 25  0503   WBC 6.8 5.8   HGB 14.2 13.1   MCV 83.2 82.2   .0 186.0       Recent Labs   Lab 25  0319 25  0503    144   K 4.5 4.2    112   CO2 19.0* 21.0   BUN 47* 43*   CREATSERUM 3.16* 2.77*   CA 9.4 9.0   * 114*       Recent Labs   Lab 25  031   ALT 11   AST 17   ALB 4.4       Recent Labs   Lab 25  1602 25  1806 25  1945 25  2212 25  0507   PGLU 85 102* 89 156* 99         Assessment/Plan:  1) Chronic kidney disease stage 4: Due to long-standing DM2 and HTN. Serum creatinine baseline ~3-3.2 mg/dL. S/p cath yesterday and renal function stable this AM. Follow renal function daily.      2) Chest pain: c/f unstable angina, Cardiology following, s/p cath with patent RCA stents and minor obstructive disease in circumflex and LAD.      3) HTN: controlled, on amlodipine, coreg and ramipril.      4) NAGMA: continue home sodium bicarb    Thank you for allowing me to  participate in this patient's care. Please feel free to call me with any questions or concerns.     Ann Snell MD  06/27/25       [1]   Current Facility-Administered Medications   Medication Dose Route Frequency    glucose (Dex4) 15 GM/59ML oral liquid 15 g  15 g Oral Q15 Min PRN    Or    glucose (Glutose) 40% oral gel 15 g  15 g Oral Q15 Min PRN    Or    glucose-vitamin C (Dex-4) chewable tab 4 tablet  4 tablet Oral Q15 Min PRN    Or    dextrose 50% injection 50 mL  50 mL Intravenous Q15 Min PRN    Or    glucose (Dex4) 15 GM/59ML oral liquid 30 g  30 g Oral Q15 Min PRN    Or    glucose (Glutose) 40% oral gel 30 g  30 g Oral Q15 Min PRN    Or    glucose-vitamin C (Dex-4) chewable tab 8 tablet  8 tablet Oral Q15 Min PRN    acetaminophen (Tylenol Extra Strength) tab 500 mg  500 mg Oral Q4H PRN    melatonin tab 3 mg  3 mg Oral Nightly PRN    polyethylene glycol (PEG 3350) (Miralax) 17 g oral packet 17 g  17 g Oral Daily PRN    sennosides (Senokot) tab 17.2 mg  17.2 mg Oral Nightly PRN    bisacodyl (Dulcolax) 10 MG rectal suppository 10 mg  10 mg Rectal Daily PRN    ondansetron (Zofran) 4 MG/2ML injection 4 mg  4 mg Intravenous Q6H PRN    metoclopramide (Reglan) 5 mg/mL injection 5 mg  5 mg Intravenous Q8H PRN    insulin aspart (NovoLOG) 100 Units/mL FlexPen 1-68 Units  1-68 Units Subcutaneous TID CC    insulin aspart (NovoLOG) 100 Units/mL FlexPen 1-10 Units  1-10 Units Subcutaneous TID AC and HS    benzonatate (Tessalon) cap 200 mg  200 mg Oral TID PRN    glycerin-hypromellose- (Artificial Tears) 0.2-0.2-1 % ophthalmic solution 1 drop  1 drop Both Eyes QID PRN    sodium chloride (Saline Mist) 0.65 % nasal solution 1 spray  1 spray Each Nare Q3H PRN    nitroglycerin (Nitrostat) SL tab 0.4 mg  0.4 mg Sublingual Q5 Min PRN    enoxaparin (Lovenox) 30 MG/0.3ML SUBQ injection 30 mg  30 mg Subcutaneous Daily    amLODIPine (Norvasc) tab 5 mg  5 mg Oral BID    aspirin DR tab 81 mg  81 mg Oral Daily    carvedilol  (Coreg) tab 25 mg  25 mg Oral BID with meals    ramipril (Altace) cap 10 mg  10 mg Oral BID    sodium bicarbonate tab 650 mg  650 mg Oral Daily    rosuvastatin (Crestor) tab 5 mg  5 mg Oral Once per day on Monday Tuesday Wednesday Thursday Friday    ticagrelor (Brilinta) tab 60 mg  60 mg Oral BID    sodium chloride 0.9% infusion   Intravenous Continuous

## 2025-06-27 NOTE — PLAN OF CARE
Discharged  home via wheelchair  Accompanied by Support staff   Belongings taken by patient/family  PIV removed  Tele box removed & placed in the drawer  Discharge Navigator completed  Discharge instructions reviewed with patient a bedside  All questions & concerns addressed at his time.           Problem: Diabetes/Glucose Control  Goal: Glucose maintained within prescribed range  Description: INTERVENTIONS:  - Monitor Blood Glucose as ordered  - Assess for signs and symptoms of hyperglycemia and hypoglycemia  - Administer ordered medications to maintain glucose within target range  - Assess barriers to adequate nutritional intake and initiate nutrition consult as needed  - Instruct patient on self management of diabetes  Outcome: Progressing     Problem: CARDIOVASCULAR - ADULT  Goal: Maintains optimal cardiac output and hemodynamic stability  Description: INTERVENTIONS:  - Monitor vital signs, rhythm, and trends  - Monitor for bleeding, hypotension and signs of decreased cardiac output  - Evaluate effectiveness of vasoactive medications to optimize hemodynamic stability  - Monitor arterial and/or venous puncture sites for bleeding and/or hematoma  - Assess quality of pulses, skin color and temperature  - Assess for signs of decreased coronary artery perfusion - ex. Angina  - Evaluate fluid balance, assess for edema, trend weights  Outcome: Progressing  Goal: Absence of cardiac arrhythmias or at baseline  Description: INTERVENTIONS:  - Continuous cardiac monitoring, monitor vital signs, obtain 12 lead EKG if indicated  - Evaluate effectiveness of antiarrhythmic and heart rate control medications as ordered  - Initiate emergency measures for life threatening arrhythmias  - Monitor electrolytes and administer replacement therapy as ordered  Outcome: Progressing

## 2025-06-27 NOTE — PLAN OF CARE
Patient is admitted for chest pain . Pt is AOX4.  VSS, afebrile and denies any pain. SpO2 maintained on RA. . Denies any SOB, cough. Tele-Apaced.  BP elevated. Gave Scheduled BP meds. IV fluid DC- ed .  Lovenox.  Cardiac control diet/QID ACCU check .  Denies any n/v/d. Voids. Up with SBA.  Pt walked in the hallway , denies any SOB and chest discomfort.  Right radial incision site looks intact, no hematoma. Will continue to monitor. Pt is updated with plan of care.          Problem: Diabetes/Glucose Control  Goal: Glucose maintained within prescribed range  Description: INTERVENTIONS:  - Monitor Blood Glucose as ordered  - Assess for signs and symptoms of hyperglycemia and hypoglycemia  - Administer ordered medications to maintain glucose within target range  - Assess barriers to adequate nutritional intake and initiate nutrition consult as needed  - Instruct patient on self management of diabetes  Outcome: Progressing     Problem: CARDIOVASCULAR - ADULT  Goal: Maintains optimal cardiac output and hemodynamic stability  Description: INTERVENTIONS:  - Monitor vital signs, rhythm, and trends  - Monitor for bleeding, hypotension and signs of decreased cardiac output  - Evaluate effectiveness of vasoactive medications to optimize hemodynamic stability  - Monitor arterial and/or venous puncture sites for bleeding and/or hematoma  - Assess quality of pulses, skin color and temperature  - Assess for signs of decreased coronary artery perfusion - ex. Angina  - Evaluate fluid balance, assess for edema, trend weights  Outcome: Progressing  Goal: Absence of cardiac arrhythmias or at baseline  Description: INTERVENTIONS:  - Continuous cardiac monitoring, monitor vital signs, obtain 12 lead EKG if indicated  - Evaluate effectiveness of antiarrhythmic and heart rate control medications as ordered  - Initiate emergency measures for life threatening arrhythmias  - Monitor electrolytes and administer replacement therapy as  ordered  Outcome: Progressing

## 2025-06-27 NOTE — PLAN OF CARE
Patient is alert & oriented x4. Pt ambulates w/ SBA. Breath sounds are clear bilateral. On room air. A-paced rhythm. No pain reported. Right radial site intact, soft, no hematoma, pulse present. Bed in low position and call light within reach. Reviewed plan of care and patient verbalizes understanding.     POC: IVF overnight.       Problem: Diabetes/Glucose Control  Goal: Glucose maintained within prescribed range  Description: INTERVENTIONS:  - Monitor Blood Glucose as ordered  - Assess for signs and symptoms of hyperglycemia and hypoglycemia  - Administer ordered medications to maintain glucose within target range  - Assess barriers to adequate nutritional intake and initiate nutrition consult as needed  - Instruct patient on self management of diabetes  Outcome: Progressing     Problem: CARDIOVASCULAR - ADULT  Goal: Maintains optimal cardiac output and hemodynamic stability  Description: INTERVENTIONS:  - Monitor vital signs, rhythm, and trends  - Monitor for bleeding, hypotension and signs of decreased cardiac output  - Evaluate effectiveness of vasoactive medications to optimize hemodynamic stability  - Monitor arterial and/or venous puncture sites for bleeding and/or hematoma  - Assess quality of pulses, skin color and temperature  - Assess for signs of decreased coronary artery perfusion - ex. Angina  - Evaluate fluid balance, assess for edema, trend weights  Outcome: Progressing  Goal: Absence of cardiac arrhythmias or at baseline  Description: INTERVENTIONS:  - Continuous cardiac monitoring, monitor vital signs, obtain 12 lead EKG if indicated  - Evaluate effectiveness of antiarrhythmic and heart rate control medications as ordered  - Initiate emergency measures for life threatening arrhythmias  - Monitor electrolytes and administer replacement therapy as ordered  Outcome: Progressing

## 2025-07-02 NOTE — PAYOR COMM NOTE
Disregard IP auth request.Pt is Observation        ADMISSION REVIEW     Payor: [employer] MEDICARE ADV PPO  Subscriber #:  [id number]  Authorization Number: [id number]    Admit date: N/A  Admit time: N/A       REVIEW DOCUMENTATION:     ED Provider Notes        ED Provider Notes signed by Gordon Sewell MD at 6/26/2025  4:48 AM       Author: Gordon Sewell MD Service: -- Author Type: Physician    Filed: 6/26/2025  4:48 AM Date of Service: 6/26/2025  3:26 AM Status: Signed    : Gordon Sewell MD (Physician)           Patient Seen in: [facility] [place]        History  Chief Complaint   Patient presents with    Chest Pain Angina     Stated Complaint: chest pain    Subjective:   HPI            For the past 3 weeks this pleasant 75-year-old gentleman has had chest pain middle of his chest sharp in nature occasionally worse with movement but not every movement.  Patient has not been sick recently cough cold runny nose or any complaints nitroglycerin has helped with the pain tonight.  Patient denies any other complaints      Objective:     Past Medical History:    Atherosclerosis of coronary artery    Cancer (HCC)    bladder    Chronic renal insufficiency    Diabetes (HCC)    Heart attack (HCC)    High blood pressure    High cholesterol    Obesity    Stroke (HCC)    X2              Past Surgical History:   Procedure Laterality Date    Angioplasty (coronary)  2016    stent right coronary artery X3    Other surgical history  2002 2003    2x knee replacements                Social History     Socioeconomic History    Marital status:    Tobacco Use    Smoking status: Never    Smokeless tobacco: Never   Vaping Use    Vaping status: Never Used   Substance and Sexual Activity    Alcohol use: No    Drug use: No     Social Drivers of Health     Food Insecurity: Low Risk  (2/10/2025)    Received from [facility]    Food Insecurity     Have there been times that your  food ran out, and you didn't have money to get more?: No     Are there times that you worry that this might happen?: No   Transportation Needs: Low Risk  (2/10/2025)    Received from [facility]    Transportation Needs     Do you have trouble getting transportation to medical appointments?: No   Housing Stability: Low Risk  (2/10/2025)    Received from [facility]    Housing Stability     Are you worried that your electric, gas, oil, or water might be shut off?: No     Are you concerned about having a safe and reliable place to live?: No                                Physical Exam    ED Triage Vitals [06/26/25 0319]   BP (!) 178/71   Pulse 90   Resp 16   Temp 98.8 °F (37.1 °C)   Temp src Oral   SpO2 96 %   O2 Device None (Room air)       Current Vitals:   Vital Signs  BP: 158/67  Pulse: 70  Resp: 20  Temp: 98.8 °F (37.1 °C)  Temp src: Oral    Oxygen Therapy  SpO2: 95 %  O2 Device: None (Room air)            Physical Exam     Awake alert patient appears in no distress HEENT exam cardiovascular exam shows regular rhythm abdomen soft nontender extremities no Cyanosis or edema no rash back exam is normal skin is nondiaphoretic      ED Course  Labs Reviewed   COMP METABOLIC PANEL (14) - Abnormal; Notable for the following components:       Result Value    Glucose 147 (*)     CO2 19.0 (*)     BUN 47 (*)     Creatinine 3.16 (*)     Calculated Osmolality 301 (*)     eGFR-Cr 20 (*)     All other components within normal limits   LIPASE - Abnormal; Notable for the following components:    Lipase 63 (*)     All other components within normal limits   TROPONIN I HIGH SENSITIVITY - Normal   CBC WITH DIFFERENTIAL WITH PLATELET   RAINBOW DRAW LAVENDER   RAINBOW DRAW LIGHT GREEN   RAINBOW DRAW BLUE     EKG    Rate, intervals and axes as noted on EKG Report.  Rate: 60  Rhythm: paced  Reading: No areas of acute ST segment elevation or depression           ED Course as of 06/26/25  0448  ------------------------------------------------------------  Time: 06/26 0413  Value: XR CHEST AP PORTABLE  (CPT=71045)  Comment: Independent interpretation by ED physician chest x-ray shows no acute disease  ------------------------------------------------------------  Time: 06/26 0440  Value: Comp Metabolic Panel (14)(!)  Comment: (Reviewed)     Differential diagnosis includes acute coronary syndrome, pneumothorax                 MDM         Admission disposition: 6/26/2025  4:48 AM           Medical Decision Making  75-year-old male presenting to the [facility] part for chest pain IV established cardiac monitor shows a paced rhythm pulse ox with no signs of hypoxia troponin levels normal lipase level slightly elevated otherwise laboratory tests are within symptoms.  Independent interpretation by ED physician chest x-ray shows no acute disease.  Patient will be admitted at this time for further evaluation    Problems Addressed:  Chest pain, rule out acute myocardial infarction: acute illness or injury    Amount and/or Complexity of Data Reviewed  Labs: ordered. Decision-making details documented in ED Course.  Radiology: ordered and independent interpretation performed. Decision-making details documented in ED Course.  ECG/medicine tests: ordered and independent interpretation performed. Decision-making details documented in ED Course.        Disposition and Plan     Clinical Impression:  1. Chest pain, rule out acute myocardial infarction         Disposition:  Admit  6/26/2025  4:48 am    Follow-up:  No follow-up provider specified.        Medications Prescribed:  Current Discharge Medication List                Supplementary Documentation:         Hospital Problems       Present on Admission  Date Reviewed: 6/26/2025          ICD-10-CM Noted POA    * (Principal) Chest pain, rule out acute myocardial infarction R07.9 6/26/2025 Unknown                                                                Signed by  Gordon Sewell MD on 6/26/2025  4:48 AM         MEDICATIONS ADMINISTERED IN LAST 1 DAY:  Administration History       None            Vitals (last day) before discharge       Date/Time Temp Pulse Resp BP SpO2 Weight O2 Device O2 Flow Rate (L/min) [facility]    06/27/25 1417 -- 60 20 172/71 -- -- -- -- NB    06/27/25 1258 -- 74 16 150/69 -- -- -- --     06/27/25 1146 -- -- -- 151/73 -- -- -- --     06/27/25 1146 97.9 °F (36.6 °C) 61 20 -- 99 % -- None (Room air) 0 L/min NB    06/27/25 1100 -- -- 21 -- -- -- -- --     06/27/25 1049 -- 61 17 154/73 -- -- -- --     06/27/25 0930 -- -- 19 171/65 -- -- -- --     06/27/25 0900 -- -- 18 -- -- -- -- --     06/27/25 0818 -- -- -- 177/66 -- -- -- --     06/27/25 0818 97.5 °F (36.4 °C) 65 18 -- 98 % -- None (Room air) 0 L/min NB    06/27/25 0700 -- -- 23 -- -- -- -- --     06/27/25 0528 98.2 °F (36.8 °C) 60 14 160/72 97 % -- None (Room air) -- AT    06/27/25 0500 -- -- 14 176/77 100 % -- -- --     06/26/25 2320 97.8 °F (36.6 °C) 63 23 156/75 95 % -- None (Room air) -- AT    06/26/25 2230 -- 60 20 157/70 -- -- -- -- OK    06/26/25 2130 -- 60 21 159/65 -- -- -- -- OK    06/26/25 2045 -- 60 21 168/69 96 % -- -- -- AT    06/26/25 2030 -- 60 23 161/65 -- -- -- -- DC    06/26/25 2015 -- 60 21 158/71 -- -- -- -- OK    06/26/25 2000 -- 60 18 141/66 -- -- -- -- OK    06/26/25 1945 97.9 °F (36.6 °C) 60 21 142/68 97 % 203 lb 7.8 oz (92.3 kg) None (Room air) -- AT    06/26/25 1608 98.1 °F (36.7 °C) 60 20 160/62 99 % -- None (Room air) -- RY    06/26/25 1149 97.6 °F (36.4 °C) 59 20 171/74 97 % -- None (Room air) --     06/26/25 0700 97.6 °F (36.4 °C) 64 20 167/64 99 % -- None (Room air) --     06/26/25 0649 -- -- -- -- -- 209 lb 11.2 oz (95.1 kg) -- --     06/26/25 0648 -- 65 20 167/64 98 % -- None (Room air) --     06/26/25 0505 -- 60 18 160/63 95 % -- -- --     06/26/25 0353 -- 70 20 158/67 95 % -- None (Room air) --     06/26/25 0335 -- -- -- -- -- -- None (Room  air) --     06/26/25 0319 98.8 °F (37.1 °C) 90 16 178/71 96 % 209 lb 7 oz (95 kg) None (Room air) -- BG          CIWA Scores (last 2 days) before discharge       None

## (undated) NOTE — ED AVS SNAPSHOT
Mr. Sharon Brown   MRN: [id number]    Department:  [address] in [place]   Date of Visit:  12/30/2019           Disclosure     Insurance plans vary and the physician(s) referred by the ER may not be covered by your plan.  Please conta tell this physician (or your personal doctor if your instructions are to return to your personal doctor) about any new or lasting problems. The primary care or specialist physician will see patients referred from the [facility] Emergency Department.  Chong Wiley

## (undated) NOTE — LETTER
[address]  [address]  Consent for Procedure/Sedation  Date:          Time:     I hereby authorize Dr. Tejada, my physician and his/her assistants (if applicable), which may include medical students, residents, and/or fellows, to perform the following surgical operation/ procedure and administer such anesthesia as may be determined necessary by my physician:  Operation/Procedure name (s)  Cardiac Catheterization, Left Ventricular Cineangiography, Bilateral Selective Coronary Angiography and/or Right Heart Catheterization; possible Percutaneous Transluminal Coronary Angioplasty, Coronary Atherectomy, Coronary Stent, Intracoronary Thrombolytic therapy, Antiplatelet therapy and/or Intravascular Ultrasound on Kin Guerra   2.   I recognize that during the surgical operation/procedure, unforeseen conditions may necessitate additional or different procedures than those listed above.  I, therefore, further authorize and request that the above-named surgeon, assistants, or designees perform such procedures as are, in their judgment, necessary and desirable.    3.   My surgeon/physician has discussed prior to my surgery the potential benefits, risks and side effects of this procedure; the likelihood of achieving goals; and potential problems that might occur during recuperation.  They also discussed reasonable alternatives to the procedure, including risks, benefits, and side effects related to the alternatives and risks related to not receiving this procedure.  I have had all my questions answered and I acknowledge that no guarantee has been made as to the result that may be obtained.    4.   Should the need arise during my operation/procedure, which includes change of level of care prior to discharge, I also consent to the administration of blood and/or blood products.  Further, I understand that despite careful testing and screening of blood or blood products by collecting  agencies, I may still be subject to ill effects as a result of receiving a blood transfusion and/or blood products.  The following are some, but not all, of the potential risks that can occur: fever and allergic reactions, hemolytic reactions, transmission of diseases such as Hepatitis, AIDS and Cytomegalovirus (CMV) and fluid overload.  In the event that I wish to have an autologous transfusion of my own blood, or a directed donor transfusion, I will discuss this with my physician.  Check only if Refusing Blood or Blood Products  I understand refusal of blood or blood products as deemed necessary by my physician may have serious consequences to my condition to include possible death. I hereby assume responsibility for my refusal and release the hospital, its personnel, and my physicians from any responsibility for the consequences of my refusal.          o  Refuse      5.   I authorize the use of any specimen, organs, tissues, body parts or foreign objects that may be removed from my body during the operation/procedure for diagnosis, research or teaching purposes and their subsequent disposal by hospital authorities.  I also authorize the release of specimen test results and/or written reports to my treating physician on the hospital medical staff or other referring or consulting physicians involved in my care, at the discretion of the Pathologist or my treating physician.    6.   I consent to the photographing or videotaping of the operations or procedures to be performed, including appropriate portions of my body for medical, scientific, or educational purposes, provided my identity is not revealed by the pictures or by descriptive texts accompanying them.  If the procedure has been photographed/videotaped, the surgeon will obtain the original picture, image, videotape or CD.  The hospital will not be responsible for storage, release or maintenance of the picture, image, tape or CD.    7.   I consent to the  presence of a  or observers in the operating room as deemed necessary by my physician or their designees.    8.   I recognize that in the event my procedure results in extended X-Ray/fluoroscopy time, I may develop a skin reaction.    9. If I have a Do Not Attempt Resuscitation (DNAR) order in place, that status will be suspended while in the operating room, procedural suite, and during the recovery period unless otherwise explicitly stated by me (or a person authorized to consent on my behalf). The surgeon or my attending physician will determine when the applicable recovery period ends for purposes of reinstating the DNAR order.  10. Patients having a sterilization procedure: I understand that if the procedure is successful the results will be permanent and it will therefore be impossible for me to inseminate, conceive, or bear children.  I also understand that the procedure is intended to result in sterility, although the result has not been guaranteed.   11. I acknowledge that my physician has explained sedation/analgesia administration to me including the risk and benefits I consent to the administration of sedation/analgesia as may be necessary or desirable in the judgment of my physician.    I CERTIFY THAT I HAVE READ AND FULLY UNDERSTAND THE ABOVE CONSENT TO OPERATION and/or OTHER PROCEDURE.      ____________________________________       _________________________________      ______________________________  Signature of Patient         Signature of Responsible Person        Printed Name of Responsible Person   ____________________________________      _________________________________      ______________________________       Signature of Witness          Relationship to Patient                       Date                                       Time  Patient Name: Kin Guerra  : 1950    Reviewed: 2024   Printed: 2025  Medical Record #: [id number] Page 1 of 1